# Patient Record
Sex: FEMALE | Race: WHITE | NOT HISPANIC OR LATINO | Employment: OTHER | URBAN - METROPOLITAN AREA
[De-identification: names, ages, dates, MRNs, and addresses within clinical notes are randomized per-mention and may not be internally consistent; named-entity substitution may affect disease eponyms.]

---

## 2017-01-03 RX ORDER — LEVOTHYROXINE SODIUM 0.03 MG/1
TABLET ORAL
Qty: 90 TABLET | Refills: 1 | Status: SHIPPED | OUTPATIENT
Start: 2017-01-03 | End: 2017-07-19 | Stop reason: SDUPTHER

## 2017-01-09 RX ORDER — AMLODIPINE, VALSARTAN AND HYDROCHLOROTHIAZIDE 5; 160; 12.5 MG/1; MG/1; MG/1
TABLET ORAL
Qty: 45 TABLET | Refills: 3 | Status: SHIPPED | OUTPATIENT
Start: 2017-01-09 | End: 2018-01-07 | Stop reason: SDUPTHER

## 2017-01-16 RX ORDER — FUROSEMIDE 20 MG/1
TABLET ORAL
Qty: 90 TABLET | Refills: 3 | Status: SHIPPED | OUTPATIENT
Start: 2017-01-16 | End: 2017-09-05 | Stop reason: SDUPTHER

## 2017-01-18 ENCOUNTER — TELEPHONE (OUTPATIENT)
Dept: INTERNAL MEDICINE | Facility: CLINIC | Age: 67
End: 2017-01-18

## 2017-01-18 NOTE — TELEPHONE ENCOUNTER
PT AWARE. SHE WILL CALL US BACK IF THINGS ARE NOT GETTING BETTER    ----- Message from Wendy Tian MD sent at 1/18/2017  1:09 PM EST -----  Try bacitracin oint as needed  Need to get pressure off area and may need wound referral  ----- Message -----     From: Molly Anderson MA     Sent: 1/18/2017  11:15 AM       To: Wendy Tian MD        ----- Message -----     From: Jenise Amaya     Sent: 1/18/2017  10:49 AM       To: Molly Anderson MA    Pt is confined to a wheel chair and she has a blister on her hip. Is their a salve or ointment that Dr Tian could call in for her?  Phone: [phone]

## 2017-02-06 ENCOUNTER — TELEPHONE (OUTPATIENT)
Dept: INTERNAL MEDICINE | Facility: CLINIC | Age: 67
End: 2017-02-06

## 2017-02-06 DIAGNOSIS — L89.229: Primary | ICD-10-CM

## 2017-02-06 NOTE — TELEPHONE ENCOUNTER
REFERRAL ORDERED. PT AWARE      ----- Message from Wendy Tian MD sent at 2/6/2017 11:20 AM EST -----  Regarding: RE: Patient Call  Contact: [phone]  Get her in to see wound care as soon as  possible  ----- Message -----     From: Molly Anderson MA     Sent: 2/6/2017   9:55 AM       To: Wendy Tian MD  Subject: FW: Patient Call                                 ON 1/18 WE ADVISED ON HER USING BACITRACIN FOR THE WOUND AND TO TAKE PRESSURE OFF THE AREA. NOW THE SPOT IS WORSE AND WILL NOT STOP DRAINING  ----- Message -----     From: Eliana Story Kael     Sent: 2/6/2017   9:19 AM       To: Molly Anderson MA  Subject: Patient Call                                     Patient called requesting to talk to you.  She thinks she needs to go to a Wound Center.  She has spots that are draining on her left hip and she can't get them to stop draining.  Said she has not had this problem before.

## 2017-02-10 ENCOUNTER — OFFICE VISIT (OUTPATIENT)
Dept: WOUND CARE | Facility: HOSPITAL | Age: 67
End: 2017-02-10
Attending: SURGERY

## 2017-02-10 PROCEDURE — G0463 HOSPITAL OUTPT CLINIC VISIT: HCPCS

## 2017-02-11 NOTE — WOUND CARE CLINIC NOTE
DATE OF VISIT: 02/10/2017    CHIEF COMPLAINT: Irritated rash of left inner thigh in patient with multiple sclerosis (R21, G635).     HISTORY OF PRESENT ILLNESS: The patient is a 36-year-old female who has had multiple sclerosis for approximately 40 years. She states that for the last several months she has had some area of irritation on her left inner thigh that has itched and flaked, and then slightly red. She denies it having drainage or pustules, or vesicles. It has come and gone, and for the most part now it is somewhat dried up but it is still present. She says she is worried because she knows someone who had a boil in that area that became infected and had Staph infection.     ALLERGIES: No known drug allergies.     MEDICATIONS:  1.  Amlodipine/valsartan.   2.  Baclofen.   3.  Fluoxetine.  4.  Lasix 20 mg a day   5.  VESIcare 10 mg a day.   6.  Nitrofurantoin.  7.  Thyroid medication 0.025 mg daily.  8.  Vitamin D.   9.  Copaxone 40 mg 3 times a week.  10.  Metoprolol.  11.  Aspirin.    PAST MEDICAL HISTORY:  1.  Hyperlipidemia.   2.  Hypertension.  3.  Hypothyroidism.  4.  Multiple sclerosis.     PAST SURGICAL HISTORY: Left shoulder surgery 15 years ago.     FAMILY HISTORY: Cancer.     SOCIAL HISTORY: The patient is . She denies alcohol, tobacco, recreational drugs or caffeine use.     REVIEW OF SYSTEMS:  HEENT: Denies cataracts, glaucoma, sinus problems, or middle ear problems.  HEMATOLOGIC: Denies anemia, hemophilia, sickle cell disease.   RESPIRATORY: Denies asthma, chronic obstructive lung disease, pneumothorax, sleep apnea, tuberculosis.   CARDIAC: Denies arrhythmias, congestive heart failure, and coronary artery disease. Does have a history of hypertension, also has a history of hyperlipidemia.   GASTROINTESTINAL: Denies cirrhosis, hepatitis, ulcers, or change in bowel habits.   ENDOCRINE: Denies diabetes, but does have thyroid disease and takes thyroid replacement.   GENITOURINARY: Denies  kidney failure or kidney stones, kidney infections, but she does have kidney infections on occasion. She does also have incontinence of urine on occasion and does urinate on herself.   RHEUMATOLOGY: Denies lupus, Raynaud's syndrome, scleroderma, or rheumatoid arthritis.   ORTHOPEDIC: Denies gout or osteoarthritis.  NEUROMUSCULAR: Does have a 40+ year history of multiple sclerosis.     PHYSICAL EXAMINATION:  GENERAL: She is a 66 inches tall and weighs about 160 pounds.   VITAL SIGNS: Temperature is 98.2, pulse 71, respirations 18, blood pressure 146/88. She is wheelchair-bound for the most part and has very poor use of her legs, and has weak upper body strength.   HEENT: Pupils are equal, round, and reactive to light. Nose and throat clear.   CHEST: Clear to percussion and auscultation.   CARDIAC: No murmurs. Normal rate and rhythm.   ABDOMEN: Soft.   MUSCULOSKELETAL: Has obvious signs of neuromuscular disease with very poor muscle tone and reflexes.   EXTREMITIES: Examination of her left inner thigh, which is the patient's complaint, shows that she has an area that is about 10 cm or so in size that is a rash area on the inner thigh. There are no weeping areas, no drainage, no openings. The wound is dry and is slightly red and erythematic, but there are no vesicles.     PLAN: This patient does not have a wound. It is not clear what this is. It is most likely she has a yeast or fungal infection. She has taken multiple antibiotics in the past. This is an area that could be intertrigo area with moisture and she could have a yeast-type infection. In some form of dermatitis, I discussed with her that this was not something that we would normally treat at the [facility]. I think is possible it could be yeast. We are going to give her a prescription for Diflucan several doses and also she is advised to get some anti-yeast and fungal medication such as nystatin powder, and she should also keep the area dry and clean,  particularly dry, which I think that some of her problem may be the wetness and moisture. In addition, she needs to be careful when she soils herself with her urine as she says she is incontinent on occasion, and make sure this is not a problem and any moisture is dried. If this does not help her and she gets worse, she needs to possibly consult a dermatologist, but the problem at this point is actually better than it has been. We will not need to see her back unless she has an open wound.         Jefferson Berman MD  TOR:co  D:   02/10/2017 17:59:22  T:   02/11/2017 04:19:07  Job ID:   [id number]  Document ID:   [id number]  Rev:  0  cc:

## 2017-03-08 ENCOUNTER — OFFICE VISIT (OUTPATIENT)
Dept: INTERNAL MEDICINE | Facility: CLINIC | Age: 67
End: 2017-03-08

## 2017-03-08 VITALS
BODY MASS INDEX: 24.91 KG/M2 | SYSTOLIC BLOOD PRESSURE: 112 MMHG | DIASTOLIC BLOOD PRESSURE: 80 MMHG | OXYGEN SATURATION: 96 % | WEIGHT: 155 LBS | HEIGHT: 66 IN | HEART RATE: 74 BPM

## 2017-03-08 DIAGNOSIS — IMO0002 ULCER: ICD-10-CM

## 2017-03-08 DIAGNOSIS — R23.8 SKIN BREAKDOWN: Primary | ICD-10-CM

## 2017-03-08 PROCEDURE — 99213 OFFICE O/P EST LOW 20 MIN: CPT | Performed by: INTERNAL MEDICINE

## 2017-03-08 RX ORDER — SULFAMETHOXAZOLE AND TRIMETHOPRIM 800; 160 MG/1; MG/1
1 TABLET ORAL 2 TIMES DAILY
Qty: 14 TABLET | Refills: 0 | Status: SHIPPED | OUTPATIENT
Start: 2017-03-08 | End: 2017-05-25

## 2017-03-08 NOTE — PROGRESS NOTES
Subjective   Peg Urbano is a 66 y.o. female here today to for infection on left inner thigh.      History of Present Illness   She has had some blisters under left left thigh for the past few weeks.  She occas  Has some clear drainage.  She did use bacitracin and it got some better.  She does say it itches occas. She is incontinent to urine and wear depends which are freq wet.  Not typically painful but can get irritated with sitting on it now has another blister starting on the other side.  Patient denies any fevers or chills.  She said initially it was just clear blisters with no erythema but eventually she developed a large area of erythema.  The drainage has always been clear.  No fevers or chills    The following portions of the patient's history were reviewed and updated as appropriate: allergies, current medications, past medical history, past social history and problem list.    Review of Systems   Skin:        Spot on left inner thigh   All other systems reviewed and are negative.      Objective   Physical Exam   Skin:   Large area of erythema posterior thigh.  She is a very small clear vesicles on the right.  No vesicles noted on the left thigh but there is some area of skin breakdown in the center.  No white border.  .  Does not feel warm       Vitals:    03/08/17 1317   BP: 112/80   Pulse: 74   SpO2: 96%        Current Outpatient Prescriptions:   •  Amlodipine-Valsartan-HCTZ 5-160-12.5 MG tablet, TAKE 1/2 TABLET DAILY AS   DIRECTED, Disp: 45 tablet, Rfl: 3  •  aspirin 81 MG tablet, Take 1 tablet by mouth daily., Disp: , Rfl:   •  baclofen (LIORESAL) 10 MG tablet, Take 1 tablet by mouth daily., Disp: , Rfl:   •  Cholecalciferol (VITAMIN D-3) 1000 UNITS capsule, Take 1 capsule by mouth daily., Disp: , Rfl:   •  COPAXONE 40 MG/ML solution prefilled syringe, Inject 1 application under the skin 3 (three) times a week., Disp: , Rfl:   •  FLUoxetine (PROzac) 40 MG capsule, TAKE 1 CAPSULE DAILY, Disp: 90  capsule, Rfl: 3  •  furosemide (LASIX) 20 MG tablet, TAKE 1 TABLET DAILY, Disp: 90 tablet, Rfl: 3  •  levothyroxine (SYNTHROID, LEVOTHROID) 25 MCG tablet, TAKE 1 TABLET DAILY, Disp: 90 tablet, Rfl: 1  •  lovastatin (MEVACOR) 20 MG tablet, Take 1 tablet by mouth Every Night., Disp: 90 tablet, Rfl: 3  •  metoprolol tartrate (LOPRESSOR) 25 MG tablet, Take 1 tablet by mouth daily., Disp: , Rfl:   •  Riboflavin 100 MG tablet, Take 1 tablet by mouth 2 (Two) Times a Day., Disp: , Rfl:   •  VESICARE 10 MG tablet, TAKE 1 TABLET DAILY, Disp: 90 tablet, Rfl: 3  •  mupirocin (BACTROBAN) 2 % ointment, Apply  topically 3 (Three) Times a Day., Disp: 30 g, Rfl: 0  •  sulfamethoxazole-trimethoprim (BACTRIM DS) 800-160 MG per tablet, Take 1 tablet by mouth 2 (Two) Times a Day., Disp: 14 tablet, Rfl: 0      Assessment/Plan   Diagnoses and all orders for this visit:    Skin breakdown    Ulcer    Other orders  -     mupirocin (BACTROBAN) 2 % ointment; Apply  topically 3 (Three) Times a Day.  -     sulfamethoxazole-trimethoprim (BACTRIM DS) 800-160 MG per tablet; Take 1 tablet by mouth 2 (Two) Times a Day.  1.  Wound on left exterior thigh: She is seeing wound care and they do not believe a believe it is something viral or fungal.  She got no better with Diflucan.  If her viral illness actively gotten better.  I wonder if this started as a viral infection now she has a secondary infection.  Since it did get some better with topical antibiotics I'm going to treat with systemic antibiotics.  I also wonder if part of this could be pressure and due to constant moisture from her incontinence.  We did culture the vesicle on the right and we'll follow-up

## 2017-03-13 LAB
HSV SKIN CULT: NORMAL
VZV SPEC QL CULT: NORMAL

## 2017-05-22 ENCOUNTER — RESULTS ENCOUNTER (OUTPATIENT)
Dept: INTERNAL MEDICINE | Facility: CLINIC | Age: 67
End: 2017-05-22

## 2017-05-22 DIAGNOSIS — E78.5 HYPERLIPIDEMIA, UNSPECIFIED HYPERLIPIDEMIA TYPE: ICD-10-CM

## 2017-05-22 DIAGNOSIS — I10 ESSENTIAL HYPERTENSION: ICD-10-CM

## 2017-05-24 LAB
ALBUMIN SERPL-MCNC: 4 G/DL (ref 3.5–5.2)
ALBUMIN/GLOB SERPL: 1.5 G/DL
ALP SERPL-CCNC: 101 U/L (ref 39–117)
ALT SERPL-CCNC: 15 U/L (ref 1–33)
AST SERPL-CCNC: 15 U/L (ref 1–32)
BILIRUB SERPL-MCNC: 0.6 MG/DL (ref 0.1–1.2)
BUN SERPL-MCNC: 17 MG/DL (ref 8–23)
BUN/CREAT SERPL: 23.6 (ref 7–25)
CALCIUM SERPL-MCNC: 9.4 MG/DL (ref 8.6–10.5)
CHLORIDE SERPL-SCNC: 99 MMOL/L (ref 98–107)
CHOLEST SERPL-MCNC: 195 MG/DL (ref 0–200)
CO2 SERPL-SCNC: 29.9 MMOL/L (ref 22–29)
CREAT SERPL-MCNC: 0.72 MG/DL (ref 0.57–1)
GLOBULIN SER CALC-MCNC: 2.7 GM/DL
GLUCOSE SERPL-MCNC: 86 MG/DL (ref 65–99)
HCV AB S/CO SERPL IA: <0.1 S/CO RATIO (ref 0–0.9)
HDLC SERPL-MCNC: 59 MG/DL (ref 40–60)
LDLC SERPL CALC-MCNC: 119 MG/DL (ref 0–100)
LDLC/HDLC SERPL: 2.01 {RATIO}
POTASSIUM SERPL-SCNC: 3.4 MMOL/L (ref 3.5–5.2)
PROT SERPL-MCNC: 6.7 G/DL (ref 6–8.5)
SODIUM SERPL-SCNC: 145 MMOL/L (ref 136–145)
TRIGL SERPL-MCNC: 87 MG/DL (ref 0–150)
TSH SERPL DL<=0.005 MIU/L-ACNC: 3.02 MIU/ML (ref 0.27–4.2)
VLDLC SERPL CALC-MCNC: 17.4 MG/DL (ref 5–40)

## 2017-05-25 ENCOUNTER — OFFICE VISIT (OUTPATIENT)
Dept: INTERNAL MEDICINE | Facility: CLINIC | Age: 67
End: 2017-05-25

## 2017-05-25 VITALS
WEIGHT: 155 LBS | SYSTOLIC BLOOD PRESSURE: 126 MMHG | OXYGEN SATURATION: 98 % | HEART RATE: 68 BPM | HEIGHT: 66 IN | BODY MASS INDEX: 24.91 KG/M2 | DIASTOLIC BLOOD PRESSURE: 84 MMHG

## 2017-05-25 DIAGNOSIS — R31.9 HEMATURIA: ICD-10-CM

## 2017-05-25 DIAGNOSIS — Z12.31 ENCOUNTER FOR SCREENING MAMMOGRAM FOR MALIGNANT NEOPLASM OF BREAST: Primary | ICD-10-CM

## 2017-05-25 DIAGNOSIS — A63.0 GENITAL WARTS: ICD-10-CM

## 2017-05-25 LAB
BILIRUB BLD-MCNC: NEGATIVE MG/DL
CLARITY, POC: ABNORMAL
COLOR UR: ABNORMAL
GLUCOSE UR STRIP-MCNC: NEGATIVE MG/DL
KETONES UR QL: NEGATIVE
LEUKOCYTE EST, POC: ABNORMAL
NITRITE UR-MCNC: NEGATIVE MG/ML
PH UR: 7 [PH] (ref 5–8)
PROT UR STRIP-MCNC: ABNORMAL MG/DL
RBC # UR STRIP: ABNORMAL /UL
SP GR UR: 1.01 (ref 1–1.03)
UROBILINOGEN UR QL: NORMAL

## 2017-05-25 PROCEDURE — 99213 OFFICE O/P EST LOW 20 MIN: CPT | Performed by: INTERNAL MEDICINE

## 2017-05-25 PROCEDURE — 81003 URINALYSIS AUTO W/O SCOPE: CPT | Performed by: INTERNAL MEDICINE

## 2017-05-27 LAB
BACTERIA UR CULT: NORMAL
BACTERIA UR CULT: NORMAL

## 2017-06-01 ENCOUNTER — OFFICE VISIT (OUTPATIENT)
Dept: OBSTETRICS AND GYNECOLOGY | Facility: CLINIC | Age: 67
End: 2017-06-01

## 2017-06-01 VITALS
DIASTOLIC BLOOD PRESSURE: 80 MMHG | WEIGHT: 160 LBS | BODY MASS INDEX: 25.71 KG/M2 | HEIGHT: 66 IN | SYSTOLIC BLOOD PRESSURE: 126 MMHG

## 2017-06-01 DIAGNOSIS — N76.4 VULVAR BOIL: Primary | ICD-10-CM

## 2017-06-01 PROCEDURE — 99203 OFFICE O/P NEW LOW 30 MIN: CPT | Performed by: OBSTETRICS & GYNECOLOGY

## 2017-06-01 RX ORDER — CEPHALEXIN 500 MG/1
500 CAPSULE ORAL EVERY 6 HOURS
Qty: 40 CAPSULE | Refills: 0 | Status: SHIPPED | OUTPATIENT
Start: 2017-06-01 | End: 2017-06-11

## 2017-06-01 NOTE — PROGRESS NOTES
"      Peg Urbano is a 66 y.o. patient who presents for follow up of   Chief Complaint   Patient presents with   • Genital Warts     removed years ago, has now recurred     This is a 66-year-old female patient of Dr. Tian presents today for evaluation of a vulvar lesion.  In the history she has had warts which had been taken off in the office but this was many years ago.  She thinks it may be the same thing.  It bleeds occasionally.  She is a paraplegic from MS and wheelchair-bound.    The following portions of the patient's history were reviewed and updated as appropriate: allergies, current medications and problem list.    Review of Systems   Constitutional: Negative.    Respiratory: Negative.    Cardiovascular: Negative.    Gastrointestinal: Negative.    Genitourinary: Positive for genital sores. Negative for dysuria.   Psychiatric/Behavioral: Negative.        /80  Ht 66\" (167.6 cm)  Wt 160 lb (72.6 kg)  BMI 25.82 kg/m2    Physical Exam   Constitutional: She is oriented to person, place, and time. She appears well-developed and well-nourished.   Abdominal: Soft. She exhibits no distension. There is no tenderness.   Genitourinary:       There is no rash, tenderness or lesion on the right labia. There is tenderness and lesion on the left labia. There is no rash on the left labia.   Neurological: She is alert and oriented to person, place, and time.   Psychiatric: She has a normal mood and affect. Her behavior is normal.   Nursing note and vitals reviewed.        Assessment/Plan   Peg was seen today for genital warts.    Diagnoses and all orders for this visit:    Vulvar boil    Other orders  -     cephalexin (KEFLEX) 500 MG capsule; Take 1 capsule by mouth Every 6 (Six) Hours for 10 days.    hese are nodular and appear to be infectious with induration and slight erythema.  The most superior and lateral of the indurations is draining slightly.  I feel this is infectious in nature and will treat with " 10 days of Keflex.  May need Bactrim to cover MRSA.  We'll see back in 1 month and reexamine.  If there is no change she will need a colposcopy and possible biopsy.       Return in about 4 weeks (around 6/29/2017) for Recheck.      Saji Sandoval MD    6/1/2017  1:39 PM

## 2017-06-06 ENCOUNTER — APPOINTMENT (OUTPATIENT)
Dept: MAMMOGRAPHY | Facility: HOSPITAL | Age: 67
End: 2017-06-06

## 2017-06-21 ENCOUNTER — TELEPHONE (OUTPATIENT)
Dept: INTERNAL MEDICINE | Facility: CLINIC | Age: 67
End: 2017-06-21

## 2017-06-27 ENCOUNTER — OFFICE VISIT (OUTPATIENT)
Dept: INTERNAL MEDICINE | Facility: CLINIC | Age: 67
End: 2017-06-27

## 2017-06-27 VITALS
OXYGEN SATURATION: 96 % | BODY MASS INDEX: 24.91 KG/M2 | SYSTOLIC BLOOD PRESSURE: 98 MMHG | HEART RATE: 72 BPM | WEIGHT: 155 LBS | DIASTOLIC BLOOD PRESSURE: 68 MMHG | TEMPERATURE: 97.8 F | HEIGHT: 66 IN

## 2017-06-27 DIAGNOSIS — J06.9 ACUTE URI: Primary | ICD-10-CM

## 2017-06-27 PROCEDURE — 99213 OFFICE O/P EST LOW 20 MIN: CPT | Performed by: NURSE PRACTITIONER

## 2017-06-27 RX ORDER — BENZONATATE 100 MG/1
100 CAPSULE ORAL 3 TIMES DAILY PRN
Qty: 30 CAPSULE | Refills: 0 | Status: SHIPPED | OUTPATIENT
Start: 2017-06-27 | End: 2017-08-22

## 2017-06-27 RX ORDER — CEFDINIR 300 MG/1
300 CAPSULE ORAL 2 TIMES DAILY
Qty: 20 CAPSULE | Refills: 0 | Status: SHIPPED | OUTPATIENT
Start: 2017-06-27 | End: 2017-07-13

## 2017-06-27 RX ORDER — WARFARIN SODIUM 2.5 MG/1
1 TABLET ORAL DAILY
Refills: 6 | COMMUNITY
Start: 2017-06-02 | End: 2018-05-21

## 2017-06-27 NOTE — PROGRESS NOTES
Subjective   Peg Urbano is a 66 y.o. female. Patient is here today for   Chief Complaint   Patient presents with   • Cough     Pt complains of having productive cough x10 days. Pt has tried taking Mucinex, Amoxicillin & Tessalon Pearls.    .    History of Present Illness   C/o cough x 10 days associated with some nasal congestion, post-nasal drainage, wheezing. The nasal drainage is clear. Denies fever, chills, shortness of air. The cough is worse at night, so she is having trouble sleeping. Denies sinus pressure. She has tried mucinex which helped to loosen her congestion. She has tried tessalon with some relief. She took amoxicillin twice a day for 3 days with minimal improvement in her symptoms.     The following portions of the patient's history were reviewed and updated as appropriate: allergies, current medications, past family history, past medical history, past social history, past surgical history and problem list.    Review of Systems   Constitutional: Positive for fatigue.   HENT: Positive for congestion, postnasal drip and rhinorrhea. Negative for sinus pressure.    Respiratory: Positive for cough, shortness of breath and wheezing.    Cardiovascular: Negative.    Gastrointestinal: Negative.    Genitourinary: Negative.        Objective   Vitals:    06/27/17 1141   BP: 98/68   Pulse: 72   Temp: 97.8 °F (36.6 °C)   SpO2: 96%     Physical Exam   Constitutional: Vital signs are normal. She appears well-developed and well-nourished.   HENT:   Right Ear: Tympanic membrane and ear canal normal.   Left Ear: Tympanic membrane and ear canal normal.   Mouth/Throat: Oropharynx is clear and moist and mucous membranes are normal.   Cardiovascular: Normal rate, regular rhythm and normal heart sounds.    Pulmonary/Chest: Effort normal and breath sounds normal.   Moist cough   Skin: Skin is warm and dry.   Psychiatric: She has a normal mood and affect. Her speech is normal.   Nursing note and vitals  reviewed.      Assessment/Plan   Peg was seen today for cough.    Diagnoses and all orders for this visit:    Acute URI  -     cefdinir (OMNICEF) 300 MG capsule; Take 1 capsule by mouth 2 (Two) Times a Day.  -     benzonatate (TESSALON PERLES) 100 MG capsule; Take 1 capsule by mouth 3 (Three) Times a Day As Needed for Cough.      Follow up if symptoms do not improve. She was also instructed to use her incentive spirometer.

## 2017-07-13 ENCOUNTER — OFFICE VISIT (OUTPATIENT)
Dept: INTERNAL MEDICINE | Facility: CLINIC | Age: 67
End: 2017-07-13

## 2017-07-13 VITALS
HEIGHT: 66 IN | SYSTOLIC BLOOD PRESSURE: 102 MMHG | WEIGHT: 155 LBS | OXYGEN SATURATION: 97 % | BODY MASS INDEX: 24.91 KG/M2 | DIASTOLIC BLOOD PRESSURE: 66 MMHG | HEART RATE: 63 BPM

## 2017-07-13 DIAGNOSIS — F32.A DEPRESSION, UNSPECIFIED DEPRESSION TYPE: ICD-10-CM

## 2017-07-13 DIAGNOSIS — S71.002A OPEN WOUND OF LEFT HIP, INITIAL ENCOUNTER: Primary | ICD-10-CM

## 2017-07-13 PROCEDURE — 99213 OFFICE O/P EST LOW 20 MIN: CPT | Performed by: INTERNAL MEDICINE

## 2017-07-13 PROCEDURE — G0438 PPPS, INITIAL VISIT: HCPCS | Performed by: INTERNAL MEDICINE

## 2017-07-13 RX ORDER — ESCITALOPRAM OXALATE 10 MG/1
10 TABLET ORAL DAILY
Qty: 30 TABLET | Refills: 3 | Status: SHIPPED | OUTPATIENT
Start: 2017-07-13 | End: 2017-09-06 | Stop reason: SDUPTHER

## 2017-07-13 NOTE — PATIENT INSTRUCTIONS
Medicare Wellness  Personal Prevention Plan of Service     Date of Office Visit:  2017  Encounter Provider:  Wendy Tian MD  Place of Service:  [facility]  Patient Name: Peg Urbano  :  1950    As part of the Medicare Wellness portion of your visit today, we are providing you with this personalized preventive plan of services (PPPS). This plan is based upon recommendations of the [place] Preventive Services Task Force (USPSTF) and the Advisory Committee on Immunization Practices (ACIP).    This lists the preventive care services that should be considered, and provides dates of when you are due. Items listed as completed are up-to-date and do not require any further intervention.    Health Maintenance   Topic Date Due   • TDAP/TD VACCINES (1 - Tdap) 10/01/1970   • MEDICARE ANNUAL WELLNESS  2016   • ZOSTER VACCINE  2016   • MAMMOGRAM  2016   • INFLUENZA VACCINE  2017   • LIPID PANEL  2018   • PNEUMOCOCCAL VACCINES (65+ LOW/MEDIUM RISK) (2 of 2 - PPSV23) 10/01/2018   • PAP SMEAR  2020   • COLONOSCOPY  10/01/2026   • HEPATITIS C SCREENING  Completed       No orders of the defined types were placed in this encounter.      Return in about 6 weeks (around 2017).

## 2017-07-13 NOTE — PROGRESS NOTES
"Subjective   Peg Urbano is a 66 y.o. female here today for a \"spot\" on left hip that is draining and right big toe nail fell off.  Pt c/o cough.      History of Present Illness   SHe has had a wound on her left hip for the past couple months that drains a bloody fluid  No pus.  No fevers or chills.  It started out as a couple of lumps under the skin  She has been feeling a little more depressed.  No SI.  She is isolated from her medical problem    The following portions of the patient's history were reviewed and updated as appropriate: allergies, current medications, past medical history, past social history and problem list.  No DM    Review of Systems   Respiratory: Positive for cough.    Skin: Positive for wound (left hip, with drainage).       Objective   Physical Exam   Constitutional: She is oriented to person, place, and time. She appears well-developed and well-nourished.   HENT:   Head: Normocephalic and atraumatic.   Right Ear: External ear normal.   Left Ear: External ear normal.   Mouth/Throat: Oropharynx is clear and moist.   Eyes: Conjunctivae and EOM are normal. Pupils are equal, round, and reactive to light.   Neck: Normal range of motion. No tracheal deviation present. No thyromegaly present.   Cardiovascular: Normal rate, regular rhythm, normal heart sounds and intact distal pulses.    Pulmonary/Chest: Effort normal and breath sounds normal.   Abdominal: Soft. Bowel sounds are normal. She exhibits no distension. There is no tenderness.   Musculoskeletal: Normal range of motion. She exhibits no edema or deformity.   Neurological: She is alert and oriented to person, place, and time.   Skin: Skin is warm and dry.   Approximately 2-3 cm wound left posterior hip.  This is one to 2 cm deep   Psychiatric: She has a normal mood and affect. Her behavior is normal. Judgment and thought content normal.   Vitals reviewed.      Vitals:    07/13/17 1102   BP: 102/66   Pulse: 63   SpO2: 97%          Current " Outpatient Prescriptions:   •  Amlodipine-Valsartan-HCTZ 5-160-12.5 MG tablet, TAKE 1/2 TABLET DAILY AS   DIRECTED, Disp: 45 tablet, Rfl: 3  •  aspirin 81 MG tablet, Take 1 tablet by mouth daily., Disp: , Rfl:   •  baclofen (LIORESAL) 10 MG tablet, Take 1 tablet by mouth daily., Disp: , Rfl:   •  Cholecalciferol (VITAMIN D-3) 1000 UNITS capsule, Take 1 capsule by mouth daily., Disp: , Rfl:   •  COPAXONE 40 MG/ML solution prefilled syringe, Inject 1 application under the skin 3 (three) times a week., Disp: , Rfl:   •  FLUoxetine (PROzac) 40 MG capsule, TAKE 1 CAPSULE DAILY, Disp: 90 capsule, Rfl: 3  •  furosemide (LASIX) 20 MG tablet, TAKE 1 TABLET DAILY, Disp: 90 tablet, Rfl: 3  •  levothyroxine (SYNTHROID, LEVOTHROID) 25 MCG tablet, TAKE 1 TABLET DAILY, Disp: 90 tablet, Rfl: 1  •  lovastatin (MEVACOR) 20 MG tablet, Take 1 tablet by mouth Every Night., Disp: 90 tablet, Rfl: 3  •  metoprolol tartrate (LOPRESSOR) 25 MG tablet, Take 1 tablet by mouth daily., Disp: , Rfl:   •  VESICARE 10 MG tablet, TAKE 1 TABLET DAILY, Disp: 90 tablet, Rfl: 3  •  warfarin (COUMADIN) 2.5 MG tablet, Take 1 tablet by mouth Daily., Disp: , Rfl: 6  •  benzonatate (TESSALON PERLES) 100 MG capsule, Take 1 capsule by mouth 3 (Three) Times a Day As Needed for Cough., Disp: 30 capsule, Rfl: 0  •  escitalopram (LEXAPRO) 10 MG tablet, Take 1 tablet by mouth Daily., Disp: 30 tablet, Rfl: 3      Assessment/Plan   Diagnoses and all orders for this visit:    Open wound of left hip, initial encounter    Depression, unspecified depression type    Other orders  -     escitalopram (LEXAPRO) 10 MG tablet; Take 1 tablet by mouth Daily.    1.  Left hip wound-wound has a slight odor with no drainage.  We have placed a wet to dry dressing and she needs to see wound care ASAP  2. Depression- Slightly worse  She is very isolated guven her chronic medical problems  I am going to d/c the prozac and try lexapro 10mg a day
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
## 2017-07-17 ENCOUNTER — APPOINTMENT (OUTPATIENT)
Dept: WOUND CARE | Facility: HOSPITAL | Age: 67
End: 2017-07-17
Attending: SURGERY

## 2017-07-17 PROCEDURE — 99284 EMERGENCY DEPT VISIT MOD MDM: CPT

## 2017-07-17 PROCEDURE — G0463 HOSPITAL OUTPT CLINIC VISIT: HCPCS

## 2017-07-18 ENCOUNTER — HOSPITAL ENCOUNTER (EMERGENCY)
Facility: HOSPITAL | Age: 67
Discharge: HOME | End: 2017-07-18
Attending: EMERGENCY MEDICINE | Admitting: EMERGENCY MEDICINE

## 2017-07-18 VITALS
DIASTOLIC BLOOD PRESSURE: 75 MMHG | HEART RATE: 68 BPM | TEMPERATURE: 98.7 F | OXYGEN SATURATION: 95 % | SYSTOLIC BLOOD PRESSURE: 123 MMHG | BODY MASS INDEX: 26.63 KG/M2 | RESPIRATION RATE: 16 BRPM | HEIGHT: 64 IN | WEIGHT: 156 LBS

## 2017-07-18 DIAGNOSIS — S71.002A BLEEDING FROM LEFT HIP WOUND, INITIAL ENCOUNTER: Primary | ICD-10-CM

## 2017-07-18 LAB
BASOPHILS # BLD AUTO: 0.02 10*3/MM3 (ref 0–0.2)
BASOPHILS NFR BLD AUTO: 0.2 % (ref 0–1.5)
DEPRECATED RDW RBC AUTO: 43.5 FL (ref 37–54)
EOSINOPHIL # BLD AUTO: 0.08 10*3/MM3 (ref 0–0.7)
EOSINOPHIL NFR BLD AUTO: 0.6 % (ref 0.3–6.2)
ERYTHROCYTE [DISTWIDTH] IN BLOOD BY AUTOMATED COUNT: 12.9 % (ref 11.7–13)
HCT VFR BLD AUTO: 39.9 % (ref 35.6–45.5)
HGB BLD-MCNC: 12.9 G/DL (ref 11.9–15.5)
IMM GRANULOCYTES # BLD: 0.04 10*3/MM3 (ref 0–0.03)
IMM GRANULOCYTES NFR BLD: 0.3 % (ref 0–0.5)
INR PPP: 2.43 (ref 0.9–1.1)
LYMPHOCYTES # BLD AUTO: 2.22 10*3/MM3 (ref 0.9–4.8)
LYMPHOCYTES NFR BLD AUTO: 18 % (ref 19.6–45.3)
MCH RBC QN AUTO: 29.7 PG (ref 26.9–32)
MCHC RBC AUTO-ENTMCNC: 32.3 G/DL (ref 32.4–36.3)
MCV RBC AUTO: 91.7 FL (ref 80.5–98.2)
MONOCYTES # BLD AUTO: 1.15 10*3/MM3 (ref 0.2–1.2)
MONOCYTES NFR BLD AUTO: 9.3 % (ref 5–12)
NEUTROPHILS # BLD AUTO: 8.81 10*3/MM3 (ref 1.9–8.1)
NEUTROPHILS NFR BLD AUTO: 71.6 % (ref 42.7–76)
PLATELET # BLD AUTO: 234 10*3/MM3 (ref 140–500)
PMV BLD AUTO: 11.1 FL (ref 6–12)
PROTHROMBIN TIME: 25.6 SECONDS (ref 11.7–14.2)
RBC # BLD AUTO: 4.35 10*6/MM3 (ref 3.9–5.2)
WBC NRBC COR # BLD: 12.32 10*3/MM3 (ref 4.5–10.7)

## 2017-07-18 PROCEDURE — 85610 PROTHROMBIN TIME: CPT | Performed by: EMERGENCY MEDICINE

## 2017-07-18 PROCEDURE — 85025 COMPLETE CBC W/AUTO DIFF WBC: CPT | Performed by: EMERGENCY MEDICINE

## 2017-07-18 NOTE — ED PROVIDER NOTES
EMERGENCY DEPARTMENT ENCOUNTER    CHIEF COMPLAINT  Chief Complaint: Wound check  History given by: patient   History limited by: n/a  Room Number: 12/12  PMD: Wendy Tian MD      HPI:  Pt is a 66 y.o. female who presents for a wound check for a wound on her left buttock. Pt states that she was seen at wound care yesterday, and after the appointment, the wound has not stopped bleeding. Pt also complains of lightheadedness. She has no other complaints at this time.  Pt is currently taking Coumadin secondary to afib. Hx of MS.     Duration:  1 day  Onset: gradual  Timing: constant   Location: left buttock  Radiation: none  Quality: bleeding  Intensity/Severity: moderate  Progression: unchanged  Associated Symptoms: lighheadedness  Aggravating Factors: none  Alleviating Factors: none  Previous Episodes: chronic pressure ulcer   Treatment before arrival: seen at wound care.    PAST MEDICAL HISTORY  Active Ambulatory Problems     Diagnosis Date Noted   • Keratitis 04/19/2016   • Cobalamin deficiency 04/19/2016   • Fatigue 04/19/2016   • Hypertension 04/19/2016   • Hypothyroidism 04/19/2016   • Multiple sclerosis 04/19/2016   • Recurrent urinary tract infection 04/19/2016   • Vitamin D deficiency 04/19/2016   • Hyperlipidemia 11/22/2016     Resolved Ambulatory Problems     Diagnosis Date Noted   • No Resolved Ambulatory Problems     Past Medical History:   Diagnosis Date   • Hyperlipidemia    • Hypertension    • Hypothyroidism    • MS (multiple sclerosis)        PAST SURGICAL HISTORY  Past Surgical History:   Procedure Laterality Date   • SHOULDER SURGERY      LEFT 1998   • TONSILLECTOMY     • TUBAL ABDOMINAL LIGATION      1984       FAMILY HISTORY  Family History   Problem Relation Age of Onset   • Gallbladder disease Mother      CANCER    • Cancer Mother    • Heart disease Father        SOCIAL HISTORY  Social History     Social History   • Marital status:      Spouse name: N/A   • Number of children: 2   • Years  of education: N/A     Occupational History   • RETIRED      Social History Main Topics   • Smoking status: Never Smoker   • Smokeless tobacco: Never Used   • Alcohol use No   • Drug use: No   • Sexual activity: Not on file     Other Topics Concern   • Not on file     Social History Narrative       ALLERGIES  Review of patient's allergies indicates no known allergies.    REVIEW OF SYSTEMS  Review of Systems   Constitutional: Negative for chills and fever.   HENT: Negative for congestion and sore throat.    Eyes: Negative.    Respiratory: Negative for cough and shortness of breath.    Cardiovascular: Negative for chest pain and leg swelling.   Gastrointestinal: Negative for abdominal pain, diarrhea and vomiting.   Genitourinary: Negative for difficulty urinating and dysuria.   Musculoskeletal: Negative for back pain and neck pain.   Skin: Positive for wound (wound to the left buttock). Negative for rash.   Allergic/Immunologic: Negative.    Neurological: Negative for dizziness, weakness, numbness and headaches.   Psychiatric/Behavioral: Negative.    All other systems reviewed and are negative.      PHYSICAL EXAM  ED Triage Vitals   Temp Heart Rate Resp BP SpO2   07/17/17 2341 07/17/17 2339 07/17/17 2339 07/17/17 2339 07/17/17 2339   98.8 °F (37.1 °C) 68 16 110/60 96 %      Temp src Heart Rate Source Patient Position BP Location FiO2 (%)   -- -- -- -- --              Physical Exam   Constitutional: She is oriented to person, place, and time and well-developed, well-nourished, and in no distress. No distress.   HENT:   Head: Normocephalic and atraumatic.   Eyes: EOM are normal. Pupils are equal, round, and reactive to light.   Neck: Normal range of motion. Neck supple.   Cardiovascular: Normal rate, regular rhythm and normal heart sounds.    Pulmonary/Chest: Effort normal and breath sounds normal. No respiratory distress.   Abdominal: Soft. There is no tenderness. There is no rebound and no guarding.   Musculoskeletal:  She exhibits no edema.   Neurological: She is alert and oriented to person, place, and time.   Skin: Skin is warm and dry. No rash noted.   Open sacral decubitus wound with packing in place. Packing is saturated with blood, however no active bleeding is present.    Psychiatric: Mood and affect normal.   Nursing note and vitals reviewed.      LAB RESULTS  Lab Results (last 24 hours)     Procedure Component Value Units Date/Time    Protime-INR [[id number]]  (Abnormal) Collected:  07/18/17 0101    Specimen:  Blood Updated:  07/18/17 0137     Protime 25.6 (H) Seconds      INR 2.43 (H)    CBC & Differential [[id number]] Collected:  07/18/17 0443    Specimen:  Blood Updated:  07/18/17 0451    Narrative:       The following orders were created for panel order CBC & Differential.  Procedure                               Abnormality         Status                     ---------                               -----------         ------                     CBC Auto Differential[[id number]]        Abnormal            Final result                 Please view results for these tests on the individual orders.    CBC Auto Differential [[id number]]  (Abnormal) Collected:  07/18/17 0443    Specimen:  Blood Updated:  07/18/17 0451     WBC 12.32 (H) 10*3/mm3      RBC 4.35 10*6/mm3      Hemoglobin 12.9 g/dL      Hematocrit 39.9 %      MCV 91.7 fL      MCH 29.7 pg      MCHC 32.3 (L) g/dL      RDW 12.9 %      RDW-SD 43.5 fl      MPV 11.1 fL      Platelets 234 10*3/mm3      Neutrophil % 71.6 %      Lymphocyte % 18.0 (L) %      Monocyte % 9.3 %      Eosinophil % 0.6 %      Basophil % 0.2 %      Immature Grans % 0.3 %      Neutrophils, Absolute 8.81 (H) 10*3/mm3      Lymphocytes, Absolute 2.22 10*3/mm3      Monocytes, Absolute 1.15 10*3/mm3      Eosinophils, Absolute 0.08 10*3/mm3      Basophils, Absolute 0.02 10*3/mm3      Immature Grans, Absolute 0.04 (H) 10*3/mm3           I ordered the above labs and reviewed the results.      PROCEDURES  Procedures      PROGRESS AND CONSULTS  ED Course     23:42  PT/INR ordered for further evaluation.     04:17  BP- 109/66 HR- 68 Temp- 98.7 °F (37.1 °C) (Temporal Artery ) O2 sat- 95%;  Advised pt that the wound is not actively bleeding. Plan to place a surgicel dressing. Awaiting CBC results. Pt understands and agrees with the plan, all questions answered.    04;18  CBC ordered for further evaluation.     06;00  BP- 123/75 HR- 68 Temp- 98.7 °F (37.1 °C) (Temporal Artery ) O2 sat- 95%  Rechecked the patient who is in NAD and is resting comfortably. After new dressing, pt remains without active bleeding. Advised pt that her hgb is normal. Pt will be discharged, and is to f/u with wound care as scheduled. Pt understands and agrees with the plan, all questions answered.    MEDICAL DECISION MAKING  Results were reviewed/discussed with the patient and they were also made aware of online access. Pt also made aware that some labs, such as cultures, will not be resulted during ER visit and follow up with PMD is necessary.     MDM  Number of Diagnoses or Management Options     Amount and/or Complexity of Data Reviewed  Clinical lab tests: ordered and reviewed  Decide to obtain previous medical records or to obtain history from someone other than the patient: yes  Review and summarize past medical records: yes (Pt was seen by wound care yesterday. )    Patient Progress  Patient progress: stable         DIAGNOSIS  Final diagnoses:   Bleeding from left hip wound, initial encounter       DISPOSITION  DISCHARGE    Patient discharged in stable condition.    Reviewed implications of results, diagnosis, meds, responsibility to follow up, warning signs and symptoms of possible worsening, potential complications and reasons to return to ER.    Patient/Family voiced understanding of above instructions.    Discussed plan for discharge, as there is no emergent indication for admission.  Pt/family is agreeable and  understands need for follow up and repeat testing.  Pt is aware that discharge does not mean that nothing is wrong but it indicates no emergency is present that requires admission and they must continue care with follow-up as given below or physician of their choice.     FOLLOW-UP  Wendy Tian MD  [address]  [address] [place] [id number] [phone]    Schedule an appointment as soon as possible for a visit           Medication List      Notice     No changes were made to your prescriptions during this visit.        Latest Documented Vital Signs:  As of 10:43 PM  BP- 123/75 HR- 68 Temp- 98.7 °F (37.1 °C) (Temporal Artery ) O2 sat- 95%    --  Documentation assistance provided by galen Chawla for Dr Paredes.  Information recorded by the scribe was done at my direction and has been verified and validated by me.        Macie Chawla  07/18/17 6652       Jesse Paredes MD  07/18/17 2534

## 2017-07-19 RX ORDER — LEVOTHYROXINE SODIUM 0.03 MG/1
TABLET ORAL
Qty: 90 TABLET | Refills: 1 | Status: SHIPPED | OUTPATIENT
Start: 2017-07-19 | End: 2018-05-21

## 2017-07-24 ENCOUNTER — TELEPHONE (OUTPATIENT)
Dept: INTERNAL MEDICINE | Facility: CLINIC | Age: 67
End: 2017-07-24

## 2017-07-24 ENCOUNTER — APPOINTMENT (OUTPATIENT)
Dept: WOUND CARE | Facility: HOSPITAL | Age: 67
End: 2017-07-24
Attending: SURGERY

## 2017-07-24 RX ORDER — TRAMADOL HYDROCHLORIDE 50 MG/1
TABLET ORAL
Qty: 30 TABLET | Refills: 0 | OUTPATIENT
Start: 2017-07-24 | End: 2017-08-23 | Stop reason: SDUPTHER
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
## 2017-07-31 ENCOUNTER — APPOINTMENT (OUTPATIENT)
Dept: WOUND CARE | Facility: HOSPITAL | Age: 67
End: 2017-07-31
Attending: SURGERY

## 2017-07-31 PROCEDURE — G0463 HOSPITAL OUTPT CLINIC VISIT: HCPCS

## 2017-08-01 ENCOUNTER — TELEPHONE (OUTPATIENT)
Dept: INTERNAL MEDICINE | Facility: CLINIC | Age: 67
End: 2017-08-01

## 2017-08-01 RX ORDER — HYDROCODONE BITARTRATE AND ACETAMINOPHEN 5; 325 MG/1; MG/1
1 TABLET ORAL EVERY 6 HOURS PRN
Qty: 12 TABLET | Refills: 0 | Status: SHIPPED | OUTPATIENT
Start: 2017-08-01 | End: 2017-08-22

## 2017-08-01 NOTE — TELEPHONE ENCOUNTER
----- Message from Molly Anderson MA sent at 8/1/2017  2:06 PM EDT -----  PT HAS A PRESSURE SORE ON HER BOTTOM. WE GAVE HER TRAMADOL 50 MG Q8HRS PRN PAIN. PT STATES THAT THIS IS NOT HELPING. IM NOT SURE IF YOU THINK WE SHOULD INCREASE THE DOSE, FREQUENCY OR THE MEDICATION. PLEASE ADVISE  ----- Message -----     From: Jenise Amaya     Sent: 8/1/2017   2:01 PM       To: Molly Anderson MA    Can you call her  back? He wanted to speak with you in the first place and you can explain it to him.    ----- Message -----     From: Molly Anderson MA     Sent: 8/1/2017  11:09 AM       To: Jenise Amaya    SHE IS GOING TO HAVE TO SCHEDULE AN APPT IF SHE WANTS/NEEDS SOMETHING STRONGER.  ----- Message -----     From: Jenise Amaya     Sent: 8/1/2017  11:05 AM       To: Molly Anderson MA    Pt's  called. Said Peg went to the wound center for wound on her hip. But the pain medicine that Dr Tian prescribed is not helping. The Wound center suggested something else be prescribed.  Phone: [phone]        I will give her a 3 day supply of hydrocodone until Dr. Tian returns to address this. She will likely need pain medication until her wound starts to heal.

## 2017-08-03 ENCOUNTER — TELEPHONE (OUTPATIENT)
Dept: INTERNAL MEDICINE | Facility: CLINIC | Age: 67
End: 2017-08-03

## 2017-08-03 NOTE — TELEPHONE ENCOUNTER
RX FAXED TO [facility] FOR REPAIRS OF THE POWER CHAIR    ----- Message from Jenise Amaya sent at 8/3/2017 10:20 AM EDT -----  Mr Urbano said the one at [facility]    ----- Message -----     From: Molly Anderson MA     Sent: 8/3/2017   8:17 AM       To: Jenise Amaya    [facility]?  ----- Message -----     From: Jenise Amaya     Sent: 8/2/2017   4:13 PM       To: Molly Anderson MA    Braxton just needs an RX stating that that her chair needs to be repaired.  Fax to [facility]

## 2017-08-07 ENCOUNTER — APPOINTMENT (OUTPATIENT)
Dept: WOUND CARE | Facility: HOSPITAL | Age: 67
End: 2017-08-07
Attending: SURGERY

## 2017-08-07 PROCEDURE — G0463 HOSPITAL OUTPT CLINIC VISIT: HCPCS

## 2017-08-14 ENCOUNTER — APPOINTMENT (OUTPATIENT)
Dept: WOUND CARE | Facility: HOSPITAL | Age: 67
End: 2017-08-14
Attending: SURGERY

## 2017-08-14 PROCEDURE — G0463 HOSPITAL OUTPT CLINIC VISIT: HCPCS

## 2017-08-21 ENCOUNTER — APPOINTMENT (OUTPATIENT)
Dept: WOUND CARE | Facility: HOSPITAL | Age: 67
End: 2017-08-21
Attending: SURGERY

## 2017-08-21 PROCEDURE — G0463 HOSPITAL OUTPT CLINIC VISIT: HCPCS

## 2017-08-22 ENCOUNTER — OFFICE VISIT (OUTPATIENT)
Dept: INTERNAL MEDICINE | Facility: CLINIC | Age: 67
End: 2017-08-22

## 2017-08-22 VITALS
OXYGEN SATURATION: 96 % | BODY MASS INDEX: 26.63 KG/M2 | HEART RATE: 63 BPM | DIASTOLIC BLOOD PRESSURE: 74 MMHG | WEIGHT: 156 LBS | SYSTOLIC BLOOD PRESSURE: 116 MMHG | HEIGHT: 64 IN

## 2017-08-22 DIAGNOSIS — N39.0 RECURRENT URINARY TRACT INFECTION: Primary | ICD-10-CM

## 2017-08-22 DIAGNOSIS — S71.002S OPEN WOUND OF LEFT HIP, SEQUELA: ICD-10-CM

## 2017-08-22 PROCEDURE — 99213 OFFICE O/P EST LOW 20 MIN: CPT | Performed by: INTERNAL MEDICINE

## 2017-08-22 RX ORDER — COLLAGENASE SANTYL 250 [ARB'U]/G
1 OINTMENT TOPICAL 3 TIMES DAILY
COMMUNITY
Start: 2017-07-25 | End: 2018-07-25

## 2017-08-22 NOTE — PROGRESS NOTES
Subjective   Peg Urbano is a 66 y.o. female here today to f/u on wound on left side of buttocks.  Pt c/o memory issue.      History of Present Illness   Wound is better but pt is just not feeling well  She says is tired and forgetful.  She did take some hydrocodone for the pain but that makes her feel too out of it.  She denies any fevers or chills.  No N/V/D.  No cough.  She reports going to the bathroom more than usual  No change in color or odor.  She says she cannot pin point what feels bad she just doesn't feel quite right.      The following portions of the patient's history were reviewed and updated as appropriate: allergies, current medications, past medical history, past social history and problem list.  She denies any change in meds other than hydrocodone for pain which she is not taking    Review of Systems   All other systems reviewed and are negative.      Objective   Physical Exam   Constitutional: She is oriented to person, place, and time. She appears well-developed and well-nourished.   HENT:   Head: Normocephalic and atraumatic.   Right Ear: External ear normal.   Left Ear: External ear normal.   Mouth/Throat: Oropharynx is clear and moist.   Eyes: Conjunctivae and EOM are normal. Pupils are equal, round, and reactive to light.   Neck: Normal range of motion. No tracheal deviation present. No thyromegaly present.   Cardiovascular: Normal rate, regular rhythm, normal heart sounds and intact distal pulses.    Pulmonary/Chest: Effort normal and breath sounds normal.   Abdominal: Soft. Bowel sounds are normal. She exhibits no distension. There is no tenderness.   Musculoskeletal: Normal range of motion. She exhibits no edema or deformity.   Neurological: She is alert and oriented to person, place, and time.   Skin: Skin is warm and dry.   Psychiatric: She has a normal mood and affect. Her behavior is normal. Judgment and thought content normal.   Vitals reviewed.      Vitals:    08/22/17 1400   BP:  116/74   Pulse: 63   SpO2: 96%          Current Outpatient Prescriptions:   •  aspirin 81 MG tablet, Take 1 tablet by mouth daily., Disp: , Rfl:   •  baclofen (LIORESAL) 10 MG tablet, Take 1 tablet by mouth daily., Disp: , Rfl:   •  Cholecalciferol (VITAMIN D-3) 1000 UNITS capsule, Take 1 capsule by mouth daily., Disp: , Rfl:   •  COPAXONE 40 MG/ML solution prefilled syringe, Inject 1 application under the skin 3 (three) times a week., Disp: , Rfl:   •  escitalopram (LEXAPRO) 10 MG tablet, Take 1 tablet by mouth Daily., Disp: 30 tablet, Rfl: 3  •  FLUoxetine (PROzac) 40 MG capsule, TAKE 1 CAPSULE DAILY, Disp: 90 capsule, Rfl: 3  •  furosemide (LASIX) 20 MG tablet, TAKE 1 TABLET DAILY, Disp: 90 tablet, Rfl: 3  •  levothyroxine (SYNTHROID, LEVOTHROID) 25 MCG tablet, TAKE 1 TABLET DAILY, Disp: 90 tablet, Rfl: 1  •  lovastatin (MEVACOR) 20 MG tablet, Take 1 tablet by mouth Every Night., Disp: 90 tablet, Rfl: 3  •  metoprolol tartrate (LOPRESSOR) 25 MG tablet, Take 1 tablet by mouth daily., Disp: , Rfl:   •  traMADol (ULTRAM) 50 MG tablet, TAKE .5-1 TABLET BY MOUTH EVERY 8 HRS PRN PAIN, Disp: 30 tablet, Rfl: 0  •  VESICARE 10 MG tablet, TAKE 1 TABLET DAILY, Disp: 90 tablet, Rfl: 3  •  warfarin (COUMADIN) 2.5 MG tablet, Take 1 tablet by mouth Daily., Disp: , Rfl: 6  •  Amlodipine-Valsartan-HCTZ 5-160-12.5 MG tablet, TAKE 1/2 TABLET DAILY AS   DIRECTED, Disp: 45 tablet, Rfl: 3  •  SANTYL 250 UNIT/GM ointment, 1 application 3 (Three) Times a Day., Disp: , Rfl:       Assessment/Plan   Diagnoses and all orders for this visit:    Recurrent urinary tract infection  -     CBC & Differential  -     Comprehensive Metabolic Panel    Open wound of left hip, sequela      1.  Recurrent urinary tract infections: We are going to go ahead and check a urine on her today as she appears a little confused  2.  Confusion: I suspect this may be from the tramadol she received before coming to her appointment today.  She does not normally take  pain medications but needs to do this hip pain from the wound.

## 2017-08-23 LAB
ALBUMIN SERPL-MCNC: 4 G/DL (ref 3.5–5.2)
ALBUMIN/GLOB SERPL: 1.2 G/DL
ALP SERPL-CCNC: 106 U/L (ref 39–117)
ALT SERPL-CCNC: 20 U/L (ref 1–33)
AST SERPL-CCNC: 18 U/L (ref 1–32)
BASOPHILS # BLD AUTO: 0.06 10*3/MM3 (ref 0–0.2)
BASOPHILS NFR BLD AUTO: 1 % (ref 0–1.5)
BILIRUB SERPL-MCNC: 0.4 MG/DL (ref 0.1–1.2)
BUN SERPL-MCNC: 42 MG/DL (ref 8–23)
BUN/CREAT SERPL: 41.6 (ref 7–25)
CALCIUM SERPL-MCNC: 9.9 MG/DL (ref 8.6–10.5)
CHLORIDE SERPL-SCNC: 100 MMOL/L (ref 98–107)
CO2 SERPL-SCNC: 26.8 MMOL/L (ref 22–29)
CREAT SERPL-MCNC: 1.01 MG/DL (ref 0.57–1)
EOSINOPHIL # BLD AUTO: 0.6 10*3/MM3 (ref 0–0.7)
EOSINOPHIL NFR BLD AUTO: 10.2 % (ref 0.3–6.2)
ERYTHROCYTE [DISTWIDTH] IN BLOOD BY AUTOMATED COUNT: 14.1 % (ref 11.7–13)
GLOBULIN SER CALC-MCNC: 3.3 GM/DL
GLUCOSE SERPL-MCNC: 98 MG/DL (ref 65–99)
HCT VFR BLD AUTO: 43.2 % (ref 35.6–45.5)
HGB BLD-MCNC: 13.4 G/DL (ref 11.9–15.5)
IMM GRANULOCYTES # BLD: 0 10*3/MM3 (ref 0–0.03)
IMM GRANULOCYTES NFR BLD: 0 % (ref 0–0.5)
LYMPHOCYTES # BLD AUTO: 1.93 10*3/MM3 (ref 0.9–4.8)
LYMPHOCYTES NFR BLD AUTO: 32.8 % (ref 19.6–45.3)
MCH RBC QN AUTO: 29.3 PG (ref 26.9–32)
MCHC RBC AUTO-ENTMCNC: 31 G/DL (ref 32.4–36.3)
MCV RBC AUTO: 94.5 FL (ref 80.5–98.2)
MONOCYTES # BLD AUTO: 0.51 10*3/MM3 (ref 0.2–1.2)
MONOCYTES NFR BLD AUTO: 8.7 % (ref 5–12)
NEUTROPHILS # BLD AUTO: 2.79 10*3/MM3 (ref 1.9–8.1)
NEUTROPHILS NFR BLD AUTO: 47.3 % (ref 42.7–76)
PLATELET # BLD AUTO: 285 10*3/MM3 (ref 140–500)
POTASSIUM SERPL-SCNC: 3.8 MMOL/L (ref 3.5–5.2)
PROT SERPL-MCNC: 7.3 G/DL (ref 6–8.5)
RBC # BLD AUTO: 4.57 10*6/MM3 (ref 3.9–5.2)
SODIUM SERPL-SCNC: 144 MMOL/L (ref 136–145)
WBC # BLD AUTO: 5.89 10*3/MM3 (ref 4.5–10.7)

## 2017-08-23 RX ORDER — TRAMADOL HYDROCHLORIDE 50 MG/1
TABLET ORAL
Qty: 30 TABLET | Refills: 0 | OUTPATIENT
Start: 2017-08-23 | End: 2018-05-21

## 2017-08-23 NOTE — TELEPHONE ENCOUNTER
RX CALLED INTO THE PHARMACY    PT NEEDED A REFILL ON THE TRAMADOL. PLEASE ADVISE    ----- Message from Nanci Wilson sent at 8/23/2017  1:23 PM EDT -----  Regarding: PATIENT SCRIPT  Patients  called to find out status of her tramadol. He said it was suppose to be called in yesterday and the pharmacy doesn't have it. Please check and call him back. Thanks

## 2017-08-25 ENCOUNTER — CLINICAL SUPPORT (OUTPATIENT)
Dept: INTERNAL MEDICINE | Facility: CLINIC | Age: 67
End: 2017-08-25

## 2017-08-25 DIAGNOSIS — R41.3 MEMORY LOSS: Primary | ICD-10-CM

## 2017-08-25 LAB
BILIRUB BLD-MCNC: NEGATIVE MG/DL
CLARITY, POC: CLEAR
COLOR UR: ABNORMAL
GLUCOSE UR STRIP-MCNC: NEGATIVE MG/DL
KETONES UR QL: NEGATIVE
LEUKOCYTE EST, POC: ABNORMAL
NITRITE UR-MCNC: POSITIVE MG/ML
PH UR: 5.5 [PH] (ref 5–8)
PROT UR STRIP-MCNC: ABNORMAL MG/DL
RBC # UR STRIP: ABNORMAL /UL
SP GR UR: 1.01 (ref 1–1.03)
UROBILINOGEN UR QL: NORMAL

## 2017-08-25 PROCEDURE — 81003 URINALYSIS AUTO W/O SCOPE: CPT | Performed by: INTERNAL MEDICINE

## 2017-08-28 ENCOUNTER — APPOINTMENT (OUTPATIENT)
Dept: WOUND CARE | Facility: HOSPITAL | Age: 67
End: 2017-08-28
Attending: SURGERY

## 2017-08-28 PROCEDURE — G0463 HOSPITAL OUTPT CLINIC VISIT: HCPCS

## 2017-08-29 LAB
BACTERIA UR CULT: ABNORMAL
BACTERIA UR CULT: ABNORMAL
OTHER ANTIBIOTIC SUSC ISLT: ABNORMAL

## 2017-08-30 RX ORDER — CIPROFLOXACIN 250 MG/1
250 TABLET, FILM COATED ORAL 2 TIMES DAILY
Qty: 14 TABLET | Refills: 0 | Status: SHIPPED | OUTPATIENT
Start: 2017-08-30 | End: 2017-11-28

## 2017-08-30 NOTE — TELEPHONE ENCOUNTER
RX FOR THE POSITIVE URINE CULTURE WAS SENT TO THE PHARMACY, PER VO FROM DR SECOR CIPRO 250 MG BID X 7 DAYS. PT AWARE

## 2017-09-05 RX ORDER — FUROSEMIDE 20 MG/1
TABLET ORAL
Qty: 90 TABLET | Refills: 1 | Status: SHIPPED | OUTPATIENT
Start: 2017-09-05 | End: 2018-03-28 | Stop reason: SDUPTHER

## 2017-09-06 RX ORDER — ESCITALOPRAM OXALATE 10 MG/1
TABLET ORAL
Qty: 90 TABLET | Refills: 3 | Status: SHIPPED | OUTPATIENT
Start: 2017-09-06

## 2017-09-11 ENCOUNTER — APPOINTMENT (OUTPATIENT)
Dept: WOUND CARE | Facility: HOSPITAL | Age: 67
End: 2017-09-11
Attending: SURGERY

## 2017-09-14 ENCOUNTER — APPOINTMENT (OUTPATIENT)
Dept: WOUND CARE | Facility: HOSPITAL | Age: 67
End: 2017-09-14
Attending: SURGERY

## 2017-09-14 PROCEDURE — G0463 HOSPITAL OUTPT CLINIC VISIT: HCPCS

## 2017-09-28 ENCOUNTER — APPOINTMENT (OUTPATIENT)
Dept: WOUND CARE | Facility: HOSPITAL | Age: 67
End: 2017-09-28
Attending: SURGERY

## 2017-09-28 PROCEDURE — G0463 HOSPITAL OUTPT CLINIC VISIT: HCPCS

## 2017-10-18 ENCOUNTER — TELEPHONE (OUTPATIENT)
Dept: INTERNAL MEDICINE | Facility: CLINIC | Age: 67
End: 2017-10-18

## 2017-10-18 DIAGNOSIS — R30.0 BURNING WITH URINATION: Primary | ICD-10-CM

## 2017-10-18 DIAGNOSIS — R35.0 FREQUENCY OF URINATION: ICD-10-CM

## 2017-10-18 LAB
BILIRUB BLD-MCNC: NEGATIVE MG/DL
CLARITY, POC: ABNORMAL
COLOR UR: YELLOW
GLUCOSE UR STRIP-MCNC: NEGATIVE MG/DL
KETONES UR QL: NEGATIVE
LEUKOCYTE EST, POC: ABNORMAL
NITRITE UR-MCNC: NEGATIVE MG/ML
PH UR: 6.5 [PH] (ref 5–8)
PROT UR STRIP-MCNC: ABNORMAL MG/DL
RBC # UR STRIP: ABNORMAL /UL
SP GR UR: 1.01 (ref 1–1.03)
UROBILINOGEN UR QL: NORMAL

## 2017-10-18 PROCEDURE — 81003 URINALYSIS AUTO W/O SCOPE: CPT | Performed by: INTERNAL MEDICINE

## 2017-10-18 NOTE — TELEPHONE ENCOUNTER
PT. DROPPED OFF U/A SAMPLE TO OUR OFFICE. I CALLED HER TO FIND OUT WHY AND SHE STATES THAT SHE IS HAVING HER NORMAL S/S OF URINARY ISSUES THAT SHE HAS SUCH AS BURNING WITH URINATION AND FREQ. WITH U/A.   I TOLD HER THAT WE WILL SEND IT OUT FOR A UCX AND CALL HER WITH THE RESULTS.   I WAS NOT SURE IF THIS IS WHAT THE PT. WAS TOLD TO DO AND IF DR. CHAPARRO TREATS HER BEFORE THE UCX RETURNS.

## 2017-10-20 LAB
BACTERIA UR CULT: NORMAL
BACTERIA UR CULT: NORMAL

## 2017-11-13 RX ORDER — LOVASTATIN 20 MG/1
TABLET ORAL
Qty: 90 TABLET | Refills: 1 | Status: SHIPPED | OUTPATIENT
Start: 2017-11-13 | End: 2018-05-30 | Stop reason: SDUPTHER

## 2017-11-13 RX ORDER — SOLIFENACIN SUCCINATE 10 MG/1
TABLET, FILM COATED ORAL
Qty: 90 TABLET | Refills: 1 | Status: SHIPPED | OUTPATIENT
Start: 2017-11-13 | End: 2018-05-06 | Stop reason: SDUPTHER

## 2017-11-28 ENCOUNTER — OFFICE VISIT (OUTPATIENT)
Dept: INTERNAL MEDICINE | Facility: CLINIC | Age: 67
End: 2017-11-28

## 2017-11-28 VITALS
OXYGEN SATURATION: 92 % | SYSTOLIC BLOOD PRESSURE: 128 MMHG | TEMPERATURE: 98.1 F | HEIGHT: 64 IN | HEART RATE: 71 BPM | DIASTOLIC BLOOD PRESSURE: 62 MMHG

## 2017-11-28 DIAGNOSIS — Z12.31 ENCOUNTER FOR SCREENING MAMMOGRAM FOR MALIGNANT NEOPLASM OF BREAST: ICD-10-CM

## 2017-11-28 DIAGNOSIS — R35.0 URINE FREQUENCY: Primary | ICD-10-CM

## 2017-11-28 DIAGNOSIS — N30.00 ACUTE CYSTITIS WITHOUT HEMATURIA: ICD-10-CM

## 2017-11-28 DIAGNOSIS — R23.8 BLISTERS OF MULTIPLE SITES: ICD-10-CM

## 2017-11-28 LAB
BILIRUB BLD-MCNC: NEGATIVE MG/DL
CLARITY, POC: CLEAR
COLOR UR: YELLOW
GLUCOSE UR STRIP-MCNC: NEGATIVE MG/DL
KETONES UR QL: NEGATIVE
LEUKOCYTE EST, POC: ABNORMAL
NITRITE UR-MCNC: POSITIVE MG/ML
PH UR: 7 [PH] (ref 5–8)
PROT UR STRIP-MCNC: ABNORMAL MG/DL
RBC # UR STRIP: NEGATIVE /UL
SP GR UR: 1.02 (ref 1–1.03)
UROBILINOGEN UR QL: NORMAL

## 2017-11-28 PROCEDURE — 81003 URINALYSIS AUTO W/O SCOPE: CPT | Performed by: INTERNAL MEDICINE

## 2017-11-28 PROCEDURE — 99213 OFFICE O/P EST LOW 20 MIN: CPT | Performed by: INTERNAL MEDICINE

## 2017-11-28 RX ORDER — NITROFURANTOIN 25; 75 MG/1; MG/1
100 CAPSULE ORAL 2 TIMES DAILY
Qty: 30 CAPSULE | Refills: 1 | Status: SHIPPED | OUTPATIENT
Start: 2017-11-28 | End: 2018-05-21

## 2017-11-28 NOTE — PROGRESS NOTES
Subjective   Peg Urbano is a 67 y.o. female.     History of Present Illness   She has been having worsening urinary sx for the past few weeks.  She denies any fevers or chills.  She dneies any nausea or emesis.  She most recently had a uti in august that responded well to abx  She has been having recurrent blisters oon the sacrum and in the groin  She has seen wound care and they said they are not pressure related but home health thinks they are pressure related    The following portions of the patient's history were reviewed and updated as appropriate: allergies, current medications, past medical history, past social history and problem list.  SHe is eating and drinking ok  Review of Systems   All other systems reviewed and are negative.      Objective   Physical Exam   Constitutional: She is oriented to person, place, and time. She appears well-developed and well-nourished.   HENT:   Head: Normocephalic and atraumatic.   Right Ear: External ear normal.   Left Ear: External ear normal.   Mouth/Throat: Oropharynx is clear and moist.   Eyes: Conjunctivae and EOM are normal. Pupils are equal, round, and reactive to light.   Neck: Normal range of motion. No tracheal deviation present. No thyromegaly present.   Cardiovascular: Normal rate, regular rhythm, normal heart sounds and intact distal pulses.    Pulmonary/Chest: Effort normal and breath sounds normal.   Abdominal: Soft. Bowel sounds are normal. She exhibits no distension. There is no tenderness.   Musculoskeletal: Normal range of motion. She exhibits no edema or deformity.   Neurological: She is alert and oriented to person, place, and time.   Skin: Skin is warm and dry.   Psychiatric: She has a normal mood and affect. Her behavior is normal. Judgment and thought content normal.   Vitals reviewed.      Vitals:    11/28/17 1521   BP: 128/62   Pulse: 71   Temp: 98.1 °F (36.7 °C)   SpO2: 92%          Assessment/Plan   Diagnoses and all orders for this  visit:    Urine frequency  -     POCT urinalysis dipstick, automated  -     Urine Culture - Urine, Urine, Clean Catch    Acute cystitis without hematuria    Blisters of multiple sites  -     Ambulatory Referral to Dermatology    Other orders  -     nitrofurantoin, macrocrystal-monohydrate, (MACROBID) 100 MG capsule; Take 1 capsule by mouth 2 (Two) Times a Day. prn      1. UTI-we will check cx and treat withmacrobid  She will hold on to rx and try it earlier the next time she has sx  2. Blisters-  She needs to see derm she is having these in areas that do not get pressure

## 2017-12-01 LAB
BACTERIA UR CULT: ABNORMAL
OTHER ANTIBIOTIC SUSC ISLT: ABNORMAL

## 2017-12-05 ENCOUNTER — HOSPITAL ENCOUNTER (OUTPATIENT)
Dept: MAMMOGRAPHY | Facility: HOSPITAL | Age: 67
Discharge: HOME | End: 2017-12-05
Admitting: INTERNAL MEDICINE

## 2017-12-05 PROCEDURE — G0202 SCR MAMMO BI INCL CAD: HCPCS

## 2017-12-06 ENCOUNTER — EPISODE CHANGES (OUTPATIENT)
Dept: CASE MANAGEMENT | Facility: OTHER | Age: 67
End: 2017-12-06

## 2018-01-08 RX ORDER — LEVOTHYROXINE SODIUM 0.03 MG/1
TABLET ORAL
Qty: 90 TABLET | Refills: 1 | Status: SHIPPED | OUTPATIENT
Start: 2018-01-08

## 2018-01-08 RX ORDER — AMLODIPINE, VALSARTAN AND HYDROCHLOROTHIAZIDE 5; 160; 12.5 MG/1; MG/1; MG/1
TABLET ORAL
Qty: 45 TABLET | Refills: 3 | Status: SHIPPED | OUTPATIENT
Start: 2018-01-08

## 2018-01-10 ENCOUNTER — PRIOR AUTHORIZATION (OUTPATIENT)
Dept: INTERNAL MEDICINE | Facility: CLINIC | Age: 68
End: 2018-01-10

## 2018-01-10 NOTE — TELEPHONE ENCOUNTER
Sent PA request to [facility] for Amlod/Valsar Tab HCTZ through [employer] with Key # [id number] and received approval.

## 2018-02-12 ENCOUNTER — TELEPHONE (OUTPATIENT)
Dept: INTERNAL MEDICINE | Facility: CLINIC | Age: 68
End: 2018-02-12

## 2018-02-12 RX ORDER — CIPROFLOXACIN 250 MG/1
250 TABLET, FILM COATED ORAL EVERY 12 HOURS SCHEDULED
Qty: 14 TABLET | Refills: 0 | Status: SHIPPED | OUTPATIENT
Start: 2018-02-12 | End: 2018-04-05

## 2018-02-12 NOTE — TELEPHONE ENCOUNTER
Patient called and states she has a UTI and urine has a really bad odor.  States the Macrobid 100 mg is not strong enough and is not working requesting to have another  RX called in.     I have ordered cipro  Let pt know

## 2018-03-28 RX ORDER — FUROSEMIDE 20 MG/1
TABLET ORAL
Qty: 90 TABLET | Refills: 1 | Status: SHIPPED | OUTPATIENT
Start: 2018-03-28 | End: 2018-09-05 | Stop reason: SDUPTHER

## 2018-04-05 ENCOUNTER — TELEPHONE (OUTPATIENT)
Dept: INTERNAL MEDICINE | Facility: CLINIC | Age: 68
End: 2018-04-05

## 2018-04-05 RX ORDER — CIPROFLOXACIN 250 MG/1
250 TABLET, FILM COATED ORAL EVERY 12 HOURS SCHEDULED
Qty: 10 TABLET | Refills: 0 | Status: SHIPPED | OUTPATIENT
Start: 2018-04-05 | End: 2018-05-21

## 2018-04-05 NOTE — TELEPHONE ENCOUNTER
PT AWARE. RX SENT TO PHARMACY    ----- Message from YOANNA Simon sent at 4/5/2018  3:56 PM EDT -----  Cipro 250 mg PO BID X5 days, suggest probiotic daily and hydrate well.   ----- Message -----  From: Molly Anderson MA  Sent: 4/5/2018   3:49 PM  To: YOANNA Simon    PT IS CALLING DUE TO HAVING URINARY FREQUENCY. SHE IS NOT ABLE TO GET A SAMPLE TO US. SHE IS WHEEL CHAIR BOUND AND HAS NO WAY TO COME IN. SHE IS WANTING TO KNOW IF WE CAN SEND IN A PRESCRIPTION FOR THE CIPRO THAT WE SENT IN THE LAST TIME. PLEASE ADVISE

## 2018-05-07 RX ORDER — SOLIFENACIN SUCCINATE 10 MG/1
TABLET, FILM COATED ORAL
Qty: 90 TABLET | Refills: 1 | Status: SHIPPED | OUTPATIENT
Start: 2018-05-07 | End: 2018-08-27 | Stop reason: ALTCHOICE

## 2018-05-21 ENCOUNTER — OFFICE VISIT (OUTPATIENT)
Dept: INTERNAL MEDICINE | Facility: CLINIC | Age: 68
End: 2018-05-21

## 2018-05-21 VITALS
DIASTOLIC BLOOD PRESSURE: 64 MMHG | HEIGHT: 64 IN | TEMPERATURE: 98 F | OXYGEN SATURATION: 95 % | SYSTOLIC BLOOD PRESSURE: 124 MMHG | HEART RATE: 76 BPM

## 2018-05-21 DIAGNOSIS — N39.0 RECURRENT URINARY TRACT INFECTION: ICD-10-CM

## 2018-05-21 DIAGNOSIS — M70.21 OLECRANON BURSITIS OF RIGHT ELBOW: Primary | ICD-10-CM

## 2018-05-21 DIAGNOSIS — E53.8 COBALAMIN DEFICIENCY: ICD-10-CM

## 2018-05-21 DIAGNOSIS — I10 ESSENTIAL HYPERTENSION: ICD-10-CM

## 2018-05-21 DIAGNOSIS — E03.8 OTHER SPECIFIED HYPOTHYROIDISM: ICD-10-CM

## 2018-05-21 PROCEDURE — 99213 OFFICE O/P EST LOW 20 MIN: CPT | Performed by: INTERNAL MEDICINE

## 2018-05-21 RX ORDER — MELOXICAM 7.5 MG/1
7.5 TABLET ORAL DAILY
Qty: 30 TABLET | Refills: 0 | Status: SHIPPED | OUTPATIENT
Start: 2018-05-21 | End: 2018-05-21 | Stop reason: SDUPTHER

## 2018-05-21 RX ORDER — MELOXICAM 7.5 MG/1
7.5 TABLET ORAL DAILY
Qty: 90 TABLET | Refills: 0 | Status: SHIPPED | OUTPATIENT
Start: 2018-05-21 | End: 2018-07-25

## 2018-05-21 RX ORDER — NITROFURANTOIN 25; 75 MG/1; MG/1
100 CAPSULE ORAL EVERY 12 HOURS SCHEDULED
Qty: 30 CAPSULE | Refills: 1 | Status: SHIPPED | OUTPATIENT
Start: 2018-05-21

## 2018-05-21 NOTE — PROGRESS NOTES
Subjective   Peg Urbano is a 67 y.o. female who states she noticed that her right elbow had swelling 1 week ago.     History of Present Illness   Pt denies any pain or redness no drainage  She just noticed the swelling on her elbow one day  Pt has been doing well with thyroid meds.  Taking as perscribed without any complications  Pt has been taking BP meds as prescribed without any problems.  No HA  No episodes of orthostasis  She would like a rx of macrobid on hand for future uti sx  This seems to have worked well in the past    The following portions of the patient's history were reviewed and updated as appropriate: allergies, current medications, past medical history, past social history and problem list.  She deneis any new activity    Review of Systems   Musculoskeletal: Positive for arthralgias (right elbow, 1 week ago).   All other systems reviewed and are negative.    Vitals:    05/21/18 1144   BP: 124/64   Pulse: 76   Temp: 98 °F (36.7 °C)   SpO2: 95%     Current Outpatient Prescriptions:   •  Amlodipine-Valsartan-HCTZ 5-160-12.5 MG tablet, TAKE 1/2 TABLET DAILY AS   DIRECTED, Disp: 45 tablet, Rfl: 3  •  aspirin 81 MG tablet, Take 1 tablet by mouth daily., Disp: , Rfl:   •  baclofen (LIORESAL) 10 MG tablet, Take 1 tablet by mouth daily., Disp: , Rfl:   •  Cholecalciferol (VITAMIN D-3) 1000 UNITS capsule, Take 1 capsule by mouth daily., Disp: , Rfl:   •  COPAXONE 40 MG/ML solution prefilled syringe, Inject 1 application under the skin 3 (three) times a week., Disp: , Rfl:   •  escitalopram (LEXAPRO) 10 MG tablet, TAKE 1 TABLET BY MOUTH DAILY, Disp: 90 tablet, Rfl: 3  •  furosemide (LASIX) 20 MG tablet, TAKE 1 TABLET DAILY, Disp: 90 tablet, Rfl: 1  •  levothyroxine (SYNTHROID, LEVOTHROID) 25 MCG tablet, TAKE 1 TABLET DAILY, Disp: 90 tablet, Rfl: 1  •  lovastatin (MEVACOR) 20 MG tablet, TAKE 1 TABLET EVERY NIGHT, Disp: 90 tablet, Rfl: 1  •  metoprolol tartrate (LOPRESSOR) 25 MG tablet, Take 1 tablet by  mouth daily., Disp: , Rfl:   •  SANTYL 250 UNIT/GM ointment, 1 application 3 (Three) Times a Day., Disp: , Rfl:   •  VESICARE 10 MG tablet, TAKE 1 TABLET DAILY, Disp: 90 tablet, Rfl: 1  Objective   Physical Exam   Constitutional: She is oriented to person, place, and time. She appears well-developed and well-nourished.   HENT:   Head: Normocephalic and atraumatic.   Right Ear: External ear normal.   Left Ear: External ear normal.   Mouth/Throat: Oropharynx is clear and moist.   Eyes: Conjunctivae and EOM are normal. Pupils are equal, round, and reactive to light.   Neck: Normal range of motion. No tracheal deviation present. No thyromegaly present.   Cardiovascular: Normal rate, regular rhythm, normal heart sounds and intact distal pulses.    Pulmonary/Chest: Effort normal and breath sounds normal.   Abdominal: She exhibits no distension. There is no tenderness.   Musculoskeletal: Normal range of motion. She exhibits no edema or deformity.   Large soft fluid filled bursa on the right elbow   Neurological: She is alert and oriented to person, place, and time.   Skin: Skin is warm and dry.   Psychiatric: She has a normal mood and affect. Her behavior is normal. Judgment and thought content normal.   Vitals reviewed.      Assessment/Plan   Peg was seen today for joint swelling.    Diagnoses and all orders for this visit:    Olecranon bursitis of right elbow  -     Ambulatory Referral to Hand Surgery  -     CBC & Differential  -     Comprehensive Metabolic Panel  -     TSH Rfx On Abnormal To Free T4    Essential hypertension  -     Comprehensive Metabolic Panel    Other specified hypothyroidism  -     TSH Rfx On Abnormal To Free T4    Cobalamin deficiency  -     CBC & Differential    Recurrent urinary tract infection    Other orders  -     nitrofurantoin, macrocrystal-monohydrate, (MACROBID) 100 MG capsule; Take 1 capsule by mouth Every 12 (Twelve) Hours. prn  -     meloxicam (MOBIC) 7.5 MG tablet; Take 1 tablet by  mouth Daily.      1. Olecranon bursitis-  refr to hand  Try compression and nsaids  2.  Hypothyroid- check labs today  3. HTN- ok with current meds

## 2018-05-30 RX ORDER — LOVASTATIN 20 MG/1
TABLET ORAL
Qty: 90 TABLET | Refills: 1 | Status: SHIPPED | OUTPATIENT
Start: 2018-05-30 | End: 2018-11-12 | Stop reason: SDUPTHER

## 2018-07-16 RX ORDER — LEVOTHYROXINE SODIUM 0.03 MG/1
TABLET ORAL
Qty: 90 TABLET | Refills: 1 | Status: SHIPPED | OUTPATIENT
Start: 2018-07-16 | End: 2018-07-25 | Stop reason: SDUPTHER

## 2018-07-21 LAB
ALBUMIN SERPL-MCNC: 3.7 G/DL (ref 3.6–4.8)
ALBUMIN/GLOB SERPL: 1.3 {RATIO} (ref 1.2–2.2)
ALP SERPL-CCNC: 110 IU/L (ref 39–117)
ALT SERPL-CCNC: 8 IU/L (ref 0–32)
AST SERPL-CCNC: 11 IU/L (ref 0–40)
BASOPHILS # BLD AUTO: 0 X10E3/UL (ref 0–0.2)
BASOPHILS NFR BLD AUTO: 0 %
BILIRUB SERPL-MCNC: 0.5 MG/DL (ref 0–1.2)
BUN SERPL-MCNC: 15 MG/DL (ref 8–27)
BUN/CREAT SERPL: 20 (ref 12–28)
CALCIUM SERPL-MCNC: 9.2 MG/DL (ref 8.7–10.3)
CHLORIDE SERPL-SCNC: 102 MMOL/L (ref 96–106)
CO2 SERPL-SCNC: 30 MMOL/L (ref 20–29)
CREAT SERPL-MCNC: 0.76 MG/DL (ref 0.57–1)
EOSINOPHIL # BLD AUTO: 0.3 X10E3/UL (ref 0–0.4)
EOSINOPHIL NFR BLD AUTO: 6 %
ERYTHROCYTE [DISTWIDTH] IN BLOOD BY AUTOMATED COUNT: 13.6 % (ref 12.3–15.4)
GLOBULIN SER CALC-MCNC: 2.8 G/DL (ref 1.5–4.5)
GLUCOSE SERPL-MCNC: 98 MG/DL (ref 65–99)
HCT VFR BLD AUTO: 37.8 % (ref 34–46.6)
HGB BLD-MCNC: 12.4 G/DL (ref 11.1–15.9)
IMM GRANULOCYTES # BLD: 0 X10E3/UL (ref 0–0.1)
IMM GRANULOCYTES NFR BLD: 0 %
LYMPHOCYTES # BLD AUTO: 1.8 X10E3/UL (ref 0.7–3.1)
LYMPHOCYTES NFR BLD AUTO: 32 %
MCH RBC QN AUTO: 29.3 PG (ref 26.6–33)
MCHC RBC AUTO-ENTMCNC: 32.8 G/DL (ref 31.5–35.7)
MCV RBC AUTO: 89 FL (ref 79–97)
MONOCYTES # BLD AUTO: 0.4 X10E3/UL (ref 0.1–0.9)
MONOCYTES NFR BLD AUTO: 7 %
NEUTROPHILS # BLD AUTO: 3.1 X10E3/UL (ref 1.4–7)
NEUTROPHILS NFR BLD AUTO: 55 %
PLATELET # BLD AUTO: 259 X10E3/UL (ref 150–379)
POTASSIUM SERPL-SCNC: 3.6 MMOL/L (ref 3.5–5.2)
PROT SERPL-MCNC: 6.5 G/DL (ref 6–8.5)
RBC # BLD AUTO: 4.23 X10E6/UL (ref 3.77–5.28)
SODIUM SERPL-SCNC: 147 MMOL/L (ref 134–144)
TSH SERPL DL<=0.005 MIU/L-ACNC: 3.19 UIU/ML (ref 0.45–4.5)
WBC # BLD AUTO: 5.7 X10E3/UL (ref 3.4–10.8)

## 2018-07-25 ENCOUNTER — OFFICE VISIT (OUTPATIENT)
Dept: INTERNAL MEDICINE | Facility: CLINIC | Age: 68
End: 2018-07-25

## 2018-07-25 VITALS
HEART RATE: 68 BPM | OXYGEN SATURATION: 96 % | SYSTOLIC BLOOD PRESSURE: 114 MMHG | TEMPERATURE: 98.8 F | DIASTOLIC BLOOD PRESSURE: 66 MMHG | HEIGHT: 64 IN

## 2018-07-25 DIAGNOSIS — I10 ESSENTIAL HYPERTENSION: Primary | ICD-10-CM

## 2018-07-25 DIAGNOSIS — G35 MULTIPLE SCLEROSIS (HCC): ICD-10-CM

## 2018-07-25 DIAGNOSIS — E78.5 HYPERLIPIDEMIA, UNSPECIFIED HYPERLIPIDEMIA TYPE: ICD-10-CM

## 2018-07-25 DIAGNOSIS — R35.0 URINARY FREQUENCY: ICD-10-CM

## 2018-07-25 DIAGNOSIS — Z00.00 MEDICARE ANNUAL WELLNESS VISIT, SUBSEQUENT: ICD-10-CM

## 2018-07-25 PROBLEM — IMO0002 ULCER: Status: RESOLVED | Noted: 2018-07-25 | Resolved: 2018-07-25

## 2018-07-25 PROBLEM — IMO0002 ULCER: Status: ACTIVE | Noted: 2018-07-25

## 2018-07-25 PROCEDURE — G0439 PPPS, SUBSEQ VISIT: HCPCS | Performed by: INTERNAL MEDICINE

## 2018-07-25 PROCEDURE — 99214 OFFICE O/P EST MOD 30 MIN: CPT | Performed by: INTERNAL MEDICINE

## 2018-07-25 NOTE — PROGRESS NOTES
Subjective   Peg Urbano is a 67 y.o. female her to follow up on HTN & Hypothyroidism.    History of Present Illness   Pt has been taking BP meds as prescribed without any problems.  No HA  No episodes of orthostasis  Pt has been taking cholesterol meds as prescribed.  No difficulties with myalgias.   Patient does complain of some pain in her neck.  She thinks she was sitting funny reading a book yesterday and it just feels really stiff.  She also has some intermittent pain in her left hip mostly positional from her left leg being twisted.  She does take Tylenol which helps with this.  She also continues to have problems with having the bathroom a lot.  This is of course difficult since she is in the wheelchair and is difficult to get in and out of it.  This Does help some.  She had seen urology in the past and they have nothing further to add at that time.  She has been free of bladder infections since the addition of the daily Macrobid.    The following portions of the patient's history were reviewed and updated as appropriate: allergies, current medications, past medical history, past social history and problem list.  He is eating and drinking well.  Occasionally does she does some weight  Review of Systems   All other systems reviewed and are negative.      Objective   Physical Exam   Constitutional: She is oriented to person, place, and time. She appears well-developed and well-nourished.   HENT:   Head: Normocephalic and atraumatic.   Eyes: Pupils are equal, round, and reactive to light. EOM are normal.   Neck: Normal range of motion. Neck supple.   .  She does have some pain with palpation on the right side of her neck and it does feel rather tight.   Cardiovascular: Normal rate, regular rhythm and normal heart sounds.    Pulmonary/Chest: Effort normal and breath sounds normal.   Abdominal: Soft. Bowel sounds are normal.   Neurological: She is alert and oriented to person, place, and time.   Skin: Skin is  warm and dry.   Nursing note and vitals reviewed.    Vitals:    07/25/18 1309   BP: 114/66   Pulse: 68   Temp: 98.8 °F (37.1 °C)   SpO2: 96%     Office Visit on 05/21/2018   Component Date Value Ref Range Status   • WBC 07/20/2018 5.7  3.4 - 10.8 x10E3/uL Final   • RBC 07/20/2018 4.23  3.77 - 5.28 x10E6/uL Final   • Hemoglobin 07/20/2018 12.4  11.1 - 15.9 g/dL Final   • Hematocrit 07/20/2018 37.8  34.0 - 46.6 % Final   • MCV 07/20/2018 89  79 - 97 fL Final   • MCH 07/20/2018 29.3  26.6 - 33.0 pg Final   • MCHC 07/20/2018 32.8  31.5 - 35.7 g/dL Final   • RDW 07/20/2018 13.6  12.3 - 15.4 % Final   • Platelets 07/20/2018 259  150 - 379 x10E3/uL Final   • Neutrophil Rel % 07/20/2018 55  Not Estab. % Final   • Lymphocyte Rel % 07/20/2018 32  Not Estab. % Final   • Monocyte Rel % 07/20/2018 7  Not Estab. % Final   • Eosinophil Rel % 07/20/2018 6  Not Estab. % Final   • Basophil Rel % 07/20/2018 0  Not Estab. % Final   • Neutrophils Absolute 07/20/2018 3.1  1.4 - 7.0 x10E3/uL Final   • Lymphocytes Absolute 07/20/2018 1.8  0.7 - 3.1 x10E3/uL Final   • Monocytes Absolute 07/20/2018 0.4  0.1 - 0.9 x10E3/uL Final   • Eosinophils Absolute 07/20/2018 0.3  0.0 - 0.4 x10E3/uL Final   • Basophils Absolute 07/20/2018 0.0  0.0 - 0.2 x10E3/uL Final   • Immature Granulocyte Rel % 07/20/2018 0  Not Estab. % Final   • Immature Grans Absolute 07/20/2018 0.0  0.0 - 0.1 x10E3/uL Final   • Glucose 07/20/2018 98  65 - 99 mg/dL Final   • BUN 07/20/2018 15  8 - 27 mg/dL Final   • Creatinine 07/20/2018 0.76  0.57 - 1.00 mg/dL Final   • eGFR Non  Am 07/20/2018 82  >59 mL/min/1.73 Final   • eGFR African Am 07/20/2018 95  >59 mL/min/1.73 Final   • BUN/Creatinine Ratio 07/20/2018 20  12 - 28 Final   • Sodium 07/20/2018 147* 134 - 144 mmol/L Final   • Potassium 07/20/2018 3.6  3.5 - 5.2 mmol/L Final   • Chloride 07/20/2018 102  96 - 106 mmol/L Final   • Total CO2 07/20/2018 30* 20 - 29 mmol/L Final   • Calcium 07/20/2018 9.2  8.7 - 10.3 mg/dL  Final   • Total Protein 07/20/2018 6.5  6.0 - 8.5 g/dL Final   • Albumin 07/20/2018 3.7  3.6 - 4.8 g/dL Final   • Globulin 07/20/2018 2.8  1.5 - 4.5 g/dL Final   • A/G Ratio 07/20/2018 1.3  1.2 - 2.2 Final   • Total Bilirubin 07/20/2018 0.5  0.0 - 1.2 mg/dL Final   • Alkaline Phosphatase 07/20/2018 110  39 - 117 IU/L Final   • AST (SGOT) 07/20/2018 11  0 - 40 IU/L Final   • ALT (SGPT) 07/20/2018 8  0 - 32 IU/L Final   • TSH 07/20/2018 3.190  0.450 - 4.500 uIU/mL Final       Current Outpatient Prescriptions:   •  Amlodipine-Valsartan-HCTZ 5-160-12.5 MG tablet, TAKE 1/2 TABLET DAILY AS   DIRECTED, Disp: 45 tablet, Rfl: 3  •  aspirin 81 MG tablet, Take 1 tablet by mouth daily., Disp: , Rfl:   •  baclofen (LIORESAL) 10 MG tablet, Take 1 tablet by mouth daily., Disp: , Rfl:   •  Cholecalciferol (VITAMIN D-3) 1000 UNITS capsule, Take 1 capsule by mouth daily., Disp: , Rfl:   •  COPAXONE 40 MG/ML solution prefilled syringe, Inject 1 application under the skin 3 (three) times a week., Disp: , Rfl:   •  escitalopram (LEXAPRO) 10 MG tablet, TAKE 1 TABLET BY MOUTH DAILY, Disp: 90 tablet, Rfl: 3  •  furosemide (LASIX) 20 MG tablet, TAKE 1 TABLET DAILY, Disp: 90 tablet, Rfl: 1  •  levothyroxine (SYNTHROID, LEVOTHROID) 25 MCG tablet, TAKE 1 TABLET DAILY, Disp: 90 tablet, Rfl: 1  •  lovastatin (MEVACOR) 20 MG tablet, TAKE 1 TABLET EVERY NIGHT, Disp: 90 tablet, Rfl: 1  •  metoprolol tartrate (LOPRESSOR) 25 MG tablet, Take 1 tablet by mouth daily., Disp: , Rfl:   •  nitrofurantoin, macrocrystal-monohydrate, (MACROBID) 100 MG capsule, Take 1 capsule by mouth Every 12 (Twelve) Hours. prn, Disp: 30 capsule, Rfl: 1  •  VESICARE 10 MG tablet, TAKE 1 TABLET DAILY, Disp: 90 tablet, Rfl: 1  •  Mirabegron ER (MYRBETRIQ) 50 MG tablet sustained-release 24 hour 24 hr tablet, Take 50 mg by mouth Daily., Disp: 30 tablet, Rfl: 2      Assessment/Plan   Diagnoses and all orders for this visit:    Essential hypertension    Hyperlipidemia, unspecified  hyperlipidemia type    Multiple sclerosis (CMS/[facility])    Medicare annual wellness visit, subsequent    Urinary frequency    Other orders  -     Mirabegron ER (MYRBETRIQ) 50 MG tablet sustained-release 24 hour 24 hr tablet; Take 50 mg by mouth Daily.        1.  Urinary frequency: We will add Myrbetriq.  I will start with 25 and increased to 50.  We will also continue her on the Vesicare.  She is actually doing pretty well with her infection right we will continue the Macrobid  2.  Joint aches and pains: Patient does not have any significant problem but she might benefit from an anti-inflammatory cream that will help with some of these pains.  I sent that to the pharmacy  3.  Hypertension: Well-controlled with current medication.  She has not received any notification that there is any problem with her valsartan  4.  Hyperlipidemia: She has done well with the lovastatin.  I have added a cholesterol test onto her labs today.

## 2018-07-25 NOTE — PATIENT INSTRUCTIONS
Medicare Wellness  Personal Prevention Plan of Service     Date of Office Visit:  2018  Encounter Provider:  Wendy Tian MD  Place of Service:  [facility]  Patient Name: Peg Urbano  :  1950    As part of the Medicare Wellness portion of your visit today, we are providing you with this personalized preventive plan of services (PPPS). This plan is based upon recommendations of the [place] Preventive Services Task Force (USPSTF) and the Advisory Committee on Immunization Practices (ACIP).    This lists the preventive care services that should be considered, and provides dates of when you are due. Items listed as completed are up-to-date and do not require any further intervention.    Health Maintenance   Topic Date Due   • TDAP/TD VACCINES (1 - Tdap) 10/01/1970   • ZOSTER VACCINE (1 of 2) 10/01/2001   • LIPID PANEL  2018   • MEDICARE ANNUAL WELLNESS  2018   • INFLUENZA VACCINE  2018   • PNEUMOCOCCAL VACCINES (65+ LOW/MEDIUM RISK) (2 of 2 - PPSV23) 10/01/2018   • MAMMOGRAM  2019   • COLONOSCOPY  10/01/2026   • HEPATITIS C SCREENING  Completed       No orders of the defined types were placed in this encounter.      No Follow-up on file.        Medicare Wellness  Personal Prevention Plan of Service     Date of Office Visit:  2018  Encounter Provider:  Wendy Tian MD  Place of Service:  [facility]  Patient Name: Peg Urbano  :  1950    As part of the Medicare Wellness portion of your visit today, we are providing you with this personalized preventive plan of services (PPPS). This plan is based upon recommendations of the [place] Preventive Services Task Force (USPSTF) and the Advisory Committee on Immunization Practices (ACIP).    This lists the preventive care services that should be considered, and provides dates of when you are due. Items listed as completed are up-to-date and  do not require any further intervention.    Health Maintenance   Topic Date Due   • TDAP/TD VACCINES (1 - Tdap) 10/01/1970   • ZOSTER VACCINE (1 of 2) 10/01/2001   • LIPID PANEL  05/23/2018   • MEDICARE ANNUAL WELLNESS  07/13/2018   • INFLUENZA VACCINE  08/01/2018   • PNEUMOCOCCAL VACCINES (65+ LOW/MEDIUM RISK) (2 of 2 - PPSV23) 10/01/2018   • MAMMOGRAM  12/05/2019   • COLONOSCOPY  10/01/2026   • HEPATITIS C SCREENING  Completed       No orders of the defined types were placed in this encounter.      No Follow-up on file.

## 2018-07-25 NOTE — PROGRESS NOTES
QUICK REFERENCE INFORMATION:  The ABCs of the Annual Wellness Visit    Subsequent Medicare Wellness Visit    HEALTH RISK ASSESSMENT    1950    Recent Hospitalizations:  No hospitalization(s) within the last year..        Current Medical Providers:  Patient Care Team:  Wendy Tian MD as PCP - General  Wendy Tian MD as PCP - Claims Attributed  Barbara Philippe RN as Care Coordinator ([facility])        Smoking Status:  History   Smoking Status   • Never Smoker   Smokeless Tobacco   • Never Used       Alcohol Consumption:  History   Alcohol Use No       Depression Screen:   PHQ-2/PHQ-9 Depression Screening 7/25/2018   Little interest or pleasure in doing things 0   Feeling down, depressed, or hopeless 1   Trouble falling or staying asleep, or sleeping too much 0   Feeling tired or having little energy 0   Poor appetite or overeating 0   Feeling bad about yourself - or that you are a failure or have let yourself or your family down 1   Trouble concentrating on things, such as reading the newspaper or watching television 0   Moving or speaking so slowly that other people could have noticed. Or the opposite - being so fidgety or restless that you have been moving around a lot more than usual 0   Thoughts that you would be better off dead, or of hurting yourself in some way 0   Total Score 2   If you checked off any problems, how difficult have these problems made it for you to do your work, take care of things at home, or get along with other people? Not difficult at all       Health Habits and Functional and Cognitive Screening:  Functional & Cognitive Status 7/25/2018   Do you have difficulty preparing food and eating? Yes   Do you have difficulty bathing yourself, getting dressed or grooming yourself? Yes   Do you have difficulty using the toilet? Yes   Do you have difficulty moving around from place to place? Yes   Do you have trouble with steps or getting out of a bed or a chair? Yes   In the past  year have you fallen or experienced a near fall? No   Current Diet Well Balanced Diet   Dental Exam Not up to date   Eye Exam Up to date   Exercise (times per week) 0 times per week   Do you need help using the phone?  No   Are you deaf or do you have serious difficulty hearing?  No   Do you need help with transportation? Yes   Do you need help shopping? Yes   Do you need help preparing meals?  Yes   Do you need help with housework?  Yes   Do you need help with laundry? Yes   Do you need help taking your medications? No   Do you need help managing money? No   Do you ever drive or ride in a car without wearing a seat belt? No   Have you felt unusual stress, anger or loneliness in the last month? Yes   Who do you live with? Spouse   If you need help, do you have trouble finding someone available to you? No   Have you been bothered in the last four weeks by sexual problems? No   Do you have difficulty concentrating, remembering or making decisions? No           Does the patient have evidence of cognitive impairment? No    Aspirin use counseling: Does not need ASA but is currently taking (advised patient that ASA is not indicated and patient chooses to stop it)      Recent Lab Results:  CMP:  Lab Results   Component Value Date    GLU 98 07/20/2018    BUN 15 07/20/2018    CREATININE 0.76 07/20/2018    EGFRIFNONA 82 07/20/2018    EGFRIFAFRI 95 07/20/2018    BCR 20 07/20/2018     (H) 07/20/2018    K 3.6 07/20/2018    CO2 30 (H) 07/20/2018    CALCIUM 9.2 07/20/2018    PROTENTOTREF 6.5 07/20/2018    ALBUMIN 3.7 07/20/2018    LABGLOBREF 2.8 07/20/2018    LABIL2 1.3 07/20/2018    BILITOT 0.5 07/20/2018    ALKPHOS 110 07/20/2018    AST 11 07/20/2018    ALT 8 07/20/2018     Lipid Panel:  Lab Results   Component Value Date    TRIG 87 05/23/2017    HDL 59 05/23/2017    VLDL 17.4 05/23/2017    LDLHDL 2.01 05/23/2017     HbA1c:       Visual Acuity:  No exam data present    Age-appropriate Screening Schedule:  Refer to the list  below for future screening recommendations based on patient's age, sex and/or medical conditions. Orders for these recommended tests are listed in the plan section. The patient has been provided with a written plan.    Health Maintenance   Topic Date Due   • TDAP/TD VACCINES (1 - Tdap) 10/01/1970   • ZOSTER VACCINE (1 of 2) 10/01/2001   • LIPID PANEL  05/23/2018   • INFLUENZA VACCINE  08/01/2018   • PNEUMOCOCCAL VACCINES (65+ LOW/MEDIUM RISK) (2 of 2 - PPSV23) 10/01/2018   • MAMMOGRAM  12/05/2019   • COLONOSCOPY  10/01/2026        Subjective   History of Present Illness    Peg Urbano is a 67 y.o. female who presents for an Subsequent Wellness Visit.    The following portions of the patient's history were reviewed and updated as appropriate: allergies, current medications, past family history, past medical history, past social history, past surgical history and problem list.    Outpatient Medications Prior to Visit   Medication Sig Dispense Refill   • Amlodipine-Valsartan-HCTZ 5-160-12.5 MG tablet TAKE 1/2 TABLET DAILY AS   DIRECTED 45 tablet 3   • aspirin 81 MG tablet Take 1 tablet by mouth daily.     • baclofen (LIORESAL) 10 MG tablet Take 1 tablet by mouth daily.     • Cholecalciferol (VITAMIN D-3) 1000 UNITS capsule Take 1 capsule by mouth daily.     • COPAXONE 40 MG/ML solution prefilled syringe Inject 1 application under the skin 3 (three) times a week.     • escitalopram (LEXAPRO) 10 MG tablet TAKE 1 TABLET BY MOUTH DAILY 90 tablet 3   • furosemide (LASIX) 20 MG tablet TAKE 1 TABLET DAILY 90 tablet 1   • levothyroxine (SYNTHROID, LEVOTHROID) 25 MCG tablet TAKE 1 TABLET DAILY 90 tablet 1   • lovastatin (MEVACOR) 20 MG tablet TAKE 1 TABLET EVERY NIGHT 90 tablet 1   • metoprolol tartrate (LOPRESSOR) 25 MG tablet Take 1 tablet by mouth daily.     • nitrofurantoin, macrocrystal-monohydrate, (MACROBID) 100 MG capsule Take 1 capsule by mouth Every 12 (Twelve) Hours. prn 30 capsule 1   • VESICARE 10 MG tablet TAKE  "1 TABLET DAILY 90 tablet 1   • levothyroxine (SYNTHROID, LEVOTHROID) 25 MCG tablet TAKE 1 TABLET DAILY 90 tablet 1   • meloxicam (MOBIC) 7.5 MG tablet TAKE 1 TABLET BY MOUTH DAILY 90 tablet 0   • SANTYL 250 UNIT/GM ointment 1 application 3 (Three) Times a Day.       No facility-administered medications prior to visit.        Patient Active Problem List   Diagnosis   • Keratitis   • Cobalamin deficiency   • Fatigue   • Hypertension   • Hypothyroidism   • Multiple sclerosis (CMS/HCC)   • Recurrent urinary tract infection   • Vitamin D deficiency   • Hyperlipidemia       Advance Care Planning:  has an advance directive - a copy HAS NOT been provided. Have asked the patient to send this to us to add to record.    Identification of Risk Factors:  Risk factors include: cardiovascular risk, inactivity and increased fall risk.    Review of Systems    Compared to one year ago, the patient feels her physical health is the same.  Compared to one year ago, the patient feels her mental health is the same.    Objective     Physical Exam    Vitals:    07/25/18 1309   BP: 114/66   BP Location: Left arm   Patient Position: Sitting   Pulse: 68   Temp: 98.8 °F (37.1 °C)   SpO2: 96%   Weight: Comment: Not able to get.   Height: 162.6 cm (64\")       Patient's There is no height or weight on file to calculate BMI. BMI is within normal parameters. No follow-up required.      Assessment/Plan   Patient Self-Management and Personalized Health Advice  The patient has been provided with information about: diet, exercise and prevention of cardiac or vascular disease and preventive services including:   · Exercise counseling provided, Nutrition counseling provided.    Visit Diagnoses:    ICD-10-CM ICD-9-CM   1. Essential hypertension I10 401.9   2. Hyperlipidemia, unspecified hyperlipidemia type E78.5 272.4   3. Multiple sclerosis (CMS/HCC) G35 340   4. Medicare annual wellness visit, subsequent Z00.00 V70.0       No orders of the defined types " were placed in this encounter.      Outpatient Encounter Prescriptions as of 7/25/2018   Medication Sig Dispense Refill   • Amlodipine-Valsartan-HCTZ 5-160-12.5 MG tablet TAKE 1/2 TABLET DAILY AS   DIRECTED 45 tablet 3   • aspirin 81 MG tablet Take 1 tablet by mouth daily.     • baclofen (LIORESAL) 10 MG tablet Take 1 tablet by mouth daily.     • Cholecalciferol (VITAMIN D-3) 1000 UNITS capsule Take 1 capsule by mouth daily.     • COPAXONE 40 MG/ML solution prefilled syringe Inject 1 application under the skin 3 (three) times a week.     • escitalopram (LEXAPRO) 10 MG tablet TAKE 1 TABLET BY MOUTH DAILY 90 tablet 3   • furosemide (LASIX) 20 MG tablet TAKE 1 TABLET DAILY 90 tablet 1   • levothyroxine (SYNTHROID, LEVOTHROID) 25 MCG tablet TAKE 1 TABLET DAILY 90 tablet 1   • lovastatin (MEVACOR) 20 MG tablet TAKE 1 TABLET EVERY NIGHT 90 tablet 1   • metoprolol tartrate (LOPRESSOR) 25 MG tablet Take 1 tablet by mouth daily.     • nitrofurantoin, macrocrystal-monohydrate, (MACROBID) 100 MG capsule Take 1 capsule by mouth Every 12 (Twelve) Hours. prn 30 capsule 1   • VESICARE 10 MG tablet TAKE 1 TABLET DAILY 90 tablet 1   • [DISCONTINUED] levothyroxine (SYNTHROID, LEVOTHROID) 25 MCG tablet TAKE 1 TABLET DAILY 90 tablet 1   • [DISCONTINUED] meloxicam (MOBIC) 7.5 MG tablet TAKE 1 TABLET BY MOUTH DAILY 90 tablet 0   • [DISCONTINUED] SANTYL 250 UNIT/GM ointment 1 application 3 (Three) Times a Day.       No facility-administered encounter medications on file as of 7/25/2018.        Reviewed use of high risk medication in the elderly: yes  Reviewed for potential of harmful drug interactions in the elderly: yes    Follow Up:  No Follow-up on file.     An After Visit Summary and PPPS with all of these plans were given to the patient.    SHe does not exercise  I have rec sh e try some chair aerobic exercise  I have rec the new shingles

## 2018-07-26 LAB
CHOLEST SERPL-MCNC: 155 MG/DL (ref 100–199)
HDLC SERPL-MCNC: 35 MG/DL
LDLC SERPL CALC-MCNC: 101 MG/DL (ref 0–99)
LDLC/HDLC SERPL: 2.9 RATIO (ref 0–3.2)
Lab: NORMAL
TRIGL SERPL-MCNC: 96 MG/DL (ref 0–149)
VLDLC SERPL CALC-MCNC: 19 MG/DL (ref 5–40)
WRITTEN AUTHORIZATION: NORMAL

## 2018-08-27 ENCOUNTER — TELEPHONE (OUTPATIENT)
Dept: INTERNAL MEDICINE | Facility: CLINIC | Age: 68
End: 2018-08-27

## 2018-08-27 NOTE — TELEPHONE ENCOUNTER
RX SENT TO PHARMACY    ----- Message from Genevieve Farley sent at 8/27/2018 12:39 PM EDT -----  Pt is requesting a formal prescription of myrbetriq 50MG for 90 Days:   She has been taking the samples and they have been working     [facility]: [phone] (Phone)

## 2018-09-05 RX ORDER — FUROSEMIDE 20 MG/1
TABLET ORAL
Qty: 90 TABLET | Refills: 1 | Status: SHIPPED | OUTPATIENT
Start: 2018-09-05

## 2018-10-22 ENCOUNTER — TELEPHONE (OUTPATIENT)
Dept: INTERNAL MEDICINE | Facility: CLINIC | Age: 68
End: 2018-10-22

## 2018-10-22 DIAGNOSIS — R53.1 WEAKNESS: Primary | ICD-10-CM

## 2018-10-22 NOTE — TELEPHONE ENCOUNTER
PT'S  AWARE. HOME HEALTH ORDERED    ----- Message from Wendy Tian MD sent at 10/22/2018 12:03 PM EDT -----  I can order it I believe but I have to se her within a certain amount of time  Not sure what that time is  ----- Message -----  From: Molly Anderson MA  Sent: 10/22/2018  10:55 AM  To: Wendy Tian MD    PT WAS SEEN AT [facility] FOR UTI. SHE WILL NOT GET OUT OF BED,  IS CONCERNED. WANTS US TO ORDER HOME HEALTH. CAN WE? HER DISCHARGE SUMMARY IS THE CHART. PLEASE ADVISE  ----- Message -----  From: Vaishnavi Hartman  Sent: 10/22/2018  10:47 AM  To: Molly Anderson MA    Son would like you to call him regarding his wife. She was in the hospital with a UTI and since being discharged she doesn't want to do anything, she's extremely tired and doesn't really want to eat anything and he didn't know if someone could come to the home to evaluate her. Maybe home health I think is what he was talking about. She won't get out of bed.

## 2018-10-24 RX ORDER — SOLIFENACIN SUCCINATE 10 MG/1
TABLET, FILM COATED ORAL
Qty: 90 TABLET | Refills: 1 | Status: SHIPPED | OUTPATIENT
Start: 2018-10-24

## 2018-10-24 RX ORDER — AMLODIPINE, VALSARTAN AND HYDROCHLOROTHIAZIDE 5; 160; 12.5 MG/1; MG/1; MG/1
TABLET ORAL
Qty: 45 TABLET | Refills: 3 | Status: SHIPPED | OUTPATIENT
Start: 2018-10-24

## 2018-11-12 RX ORDER — LOVASTATIN 20 MG/1
TABLET ORAL
Qty: 90 TABLET | Refills: 1 | Status: SHIPPED | OUTPATIENT
Start: 2018-11-12

## 2018-11-20 ENCOUNTER — TELEPHONE (OUTPATIENT)
Dept: INTERNAL MEDICINE | Facility: CLINIC | Age: 68
End: 2018-11-20

## 2018-11-20 NOTE — TELEPHONE ENCOUNTER
PT'S  AWARE. THEY ALSO HAVE A F/U WITH CARDIOLOGY ON 12/3    ----- Message from Wendy Tian MD sent at 11/20/2018 12:50 PM EST -----  Resume metoprolol  If she is having any sx she will need to go to the ER  ----- Message -----  From: Molly Anderson MA  Sent: 11/20/2018  11:37 AM  To: Wendy Tian MD    [facility] WITH HOME HEALTH CALLED. PT'S HEART RATE  /80. SHE IS NOT HAVING ANY SYMPTOMS, NO SOA OR SWELLING. PTS'  HAS NOT BEEN GIVING HER THE LASIX 20 MG OR THE METOPROLOL 25 MG. PLEASE ADVISE

## 2018-11-26 ENCOUNTER — TELEPHONE (OUTPATIENT)
Dept: INTERNAL MEDICINE | Facility: CLINIC | Age: 68
End: 2018-11-26

## 2018-11-26 DIAGNOSIS — L89.309 PRESSURE INJURY OF SKIN OF BUTTOCK, UNSPECIFIED INJURY STAGE, UNSPECIFIED LATERALITY: Primary | ICD-10-CM

## 2018-11-26 NOTE — TELEPHONE ENCOUNTER
Referral ordered for wound care    ----- Message from Genevieve Chao sent at 11/26/2018 11:18 AM EST -----  Regarding: PHONE CALL  Contact: [phone]   asked to speak to you about a bed sore that she has.

## 2018-11-30 ENCOUNTER — TELEPHONE (OUTPATIENT)
Dept: INTERNAL MEDICINE | Facility: CLINIC | Age: 68
End: 2018-11-30

## 2018-11-30 NOTE — TELEPHONE ENCOUNTER
[facility] Nurse called to say that they have been unable to reach Ms. Mayers. No answer at her door or phone.

## 2019-01-07 RX ORDER — LEVOTHYROXINE SODIUM 0.03 MG/1
TABLET ORAL
Qty: 90 TABLET | Refills: 1 | Status: SHIPPED | OUTPATIENT
Start: 2019-01-07

## 2019-01-29 ENCOUNTER — TELEPHONE (OUTPATIENT)
Dept: INTERNAL MEDICINE | Facility: CLINIC | Age: 69
End: 2019-01-29

## 2019-01-29 NOTE — TELEPHONE ENCOUNTER
PT'S  STATES THAT SHE IS CURRENTLY IN A NURSING HOME AND DOESN'T KNOW WHAT WILL HAPPEN. HE WILL CALL US IF THERE IS ANY ISSUE WITH THE INSURANCE AND THE MEDICATIONS    ----- Message from Wendy Tian MD sent at 1/29/2019  8:20 AM EST -----  Ok  Let me know the doses   ----- Message -----  From: Molly Anderson MA  Sent: 1/28/2019   3:19 PM  To: Wendy Tian MD    PT INSURANCE IS NOT GOING TO COVER AMLODIPINE/VALSARTAN/HCTZ 5-160-12.5 MG 1/2 TABLET QD.    THEY WILL COVER OLMESARTAN/AMLODIPINE/HCTZ. WOULD THIS BE A GOOD ALTERNATIVE FOR PT? IF NOT WE HAVE TO TELL THEM WHY.

## 2019-02-12 RX ORDER — MIRABEGRON 50 MG/1
TABLET, FILM COATED, EXTENDED RELEASE ORAL
Qty: 90 TABLET | Refills: 1 | OUTPATIENT
Start: 2019-02-12

## 2020-01-06 ENCOUNTER — LAB REQUISITION (OUTPATIENT)
Dept: LAB | Facility: HOSPITAL | Age: 70
End: 2020-01-06

## 2020-01-06 DIAGNOSIS — Z00.00 ROUTINE GENERAL MEDICAL EXAMINATION AT A HEALTH CARE FACILITY: ICD-10-CM

## 2020-01-06 LAB
BASOPHILS # BLD AUTO: 0.11 10*3/MM3 (ref 0–0.2)
BASOPHILS NFR BLD AUTO: 1.5 % (ref 0–1.5)
DEPRECATED RDW RBC AUTO: 43.7 FL (ref 37–54)
EOSINOPHIL # BLD AUTO: 0.43 10*3/MM3 (ref 0–0.4)
EOSINOPHIL NFR BLD AUTO: 6 % (ref 0.3–6.2)
ERYTHROCYTE [DISTWIDTH] IN BLOOD BY AUTOMATED COUNT: 13.8 % (ref 12.3–15.4)
HCT VFR BLD AUTO: 40.5 % (ref 34–46.6)
HGB BLD-MCNC: 13.2 G/DL (ref 12–15.9)
IMM GRANULOCYTES # BLD AUTO: 0.02 10*3/MM3 (ref 0–0.05)
IMM GRANULOCYTES NFR BLD AUTO: 0.3 % (ref 0–0.5)
LYMPHOCYTES # BLD AUTO: 3.04 10*3/MM3 (ref 0.7–3.1)
LYMPHOCYTES NFR BLD AUTO: 42.3 % (ref 19.6–45.3)
MCH RBC QN AUTO: 28.8 PG (ref 26.6–33)
MCHC RBC AUTO-ENTMCNC: 32.6 G/DL (ref 31.5–35.7)
MCV RBC AUTO: 88.2 FL (ref 79–97)
MONOCYTES # BLD AUTO: 0.69 10*3/MM3 (ref 0.1–0.9)
MONOCYTES NFR BLD AUTO: 9.6 % (ref 5–12)
NEUTROPHILS # BLD AUTO: 2.9 10*3/MM3 (ref 1.7–7)
NEUTROPHILS NFR BLD AUTO: 40.3 % (ref 42.7–76)
NRBC BLD AUTO-RTO: 0 /100 WBC (ref 0–0.2)
PLATELET # BLD AUTO: 373 10*3/MM3 (ref 140–450)
PMV BLD AUTO: 10.8 FL (ref 6–12)
RBC # BLD AUTO: 4.59 10*6/MM3 (ref 3.77–5.28)
WBC NRBC COR # BLD: 7.19 10*3/MM3 (ref 3.4–10.8)

## 2020-01-06 PROCEDURE — 85025 COMPLETE CBC W/AUTO DIFF WBC: CPT

## 2020-01-28 ENCOUNTER — LAB REQUISITION (OUTPATIENT)
Dept: LAB | Facility: HOSPITAL | Age: 70
End: 2020-01-28

## 2020-01-28 DIAGNOSIS — Z00.00 ROUTINE GENERAL MEDICAL EXAMINATION AT A HEALTH CARE FACILITY: ICD-10-CM

## 2020-01-28 LAB
BACTERIA UR QL AUTO: ABNORMAL /HPF
BILIRUB UR QL STRIP: NEGATIVE
CLARITY UR: ABNORMAL
COLOR UR: YELLOW
GLUCOSE UR STRIP-MCNC: NEGATIVE MG/DL
HGB UR QL STRIP.AUTO: ABNORMAL
HYALINE CASTS UR QL AUTO: ABNORMAL /LPF
KETONES UR QL STRIP: NEGATIVE
LEUKOCYTE ESTERASE UR QL STRIP.AUTO: ABNORMAL
NITRITE UR QL STRIP: POSITIVE
PH UR STRIP.AUTO: <=5 [PH] (ref 5–8)
PROT UR QL STRIP: ABNORMAL
RBC # UR: ABNORMAL /HPF
REF LAB TEST METHOD: ABNORMAL
SP GR UR STRIP: >=1.03 (ref 1–1.03)
SQUAMOUS #/AREA URNS HPF: ABNORMAL /HPF
UROBILINOGEN UR QL STRIP: ABNORMAL
WBC UR QL AUTO: ABNORMAL /HPF

## 2020-01-28 PROCEDURE — 81001 URINALYSIS AUTO W/SCOPE: CPT

## 2020-07-07 NOTE — TELEPHONE ENCOUNTER
PT AWARE    ----- Message from YOANNA Simon sent at 6/21/2017 12:25 PM EDT -----  She needs to bee seen either here or [facility].  ----- Message -----     From: Molly Anderson MA     Sent: 6/21/2017  10:42 AM       To: YOANNA Simon    PT HAS HAD A FEVER, COUGH WITH RATTLING IN HER CHEST X 3 DAYS PLEASE ADVISE.  WAS SICK WITH THE SAME SYMPTOMS NOW SHE HAS IT.  ----- Message -----     From: Jenise Amaya     Sent: 6/21/2017  10:30 AM       To: Molly Anderson MA    Peg has a cold and wants something called in.  Phone: [phone]        
Abdomen soft, non-tender, no rebound or guarding. +LUQ and epigastric TTP.